# Patient Record
Sex: MALE | Race: OTHER | HISPANIC OR LATINO | URBAN - METROPOLITAN AREA
[De-identification: names, ages, dates, MRNs, and addresses within clinical notes are randomized per-mention and may not be internally consistent; named-entity substitution may affect disease eponyms.]

---

## 2019-09-17 ENCOUNTER — EMERGENCY (EMERGENCY)
Facility: HOSPITAL | Age: 79
LOS: 1 days | Discharge: HOME | End: 2019-09-17
Attending: EMERGENCY MEDICINE
Payer: MEDICAID

## 2019-09-17 VITALS
HEIGHT: 61 IN | OXYGEN SATURATION: 97 % | TEMPERATURE: 98 F | HEART RATE: 76 BPM | WEIGHT: 149.91 LBS | SYSTOLIC BLOOD PRESSURE: 133 MMHG | DIASTOLIC BLOOD PRESSURE: 81 MMHG | RESPIRATION RATE: 20 BRPM

## 2019-09-17 DIAGNOSIS — Z94.0 KIDNEY TRANSPLANT STATUS: Chronic | ICD-10-CM

## 2019-09-17 PROCEDURE — 93971 EXTREMITY STUDY: CPT

## 2019-09-17 PROCEDURE — 73562 X-RAY EXAM OF KNEE 3: CPT

## 2019-09-17 PROCEDURE — 99284 EMERGENCY DEPT VISIT MOD MDM: CPT

## 2019-09-17 PROCEDURE — 93971 EXTREMITY STUDY: CPT | Mod: 26,RT

## 2019-09-17 PROCEDURE — 73562 X-RAY EXAM OF KNEE 3: CPT | Mod: 26,RT

## 2019-09-17 PROCEDURE — 99284 EMERGENCY DEPT VISIT MOD MDM: CPT | Mod: 25

## 2019-09-17 RX ORDER — ACETAMINOPHEN 500 MG
650 TABLET ORAL ONCE
Refills: 0 | Status: COMPLETED | OUTPATIENT
Start: 2019-09-17 | End: 2019-09-17

## 2019-09-17 RX ADMIN — Medication 650 MILLIGRAM(S): at 14:30

## 2019-09-17 RX ADMIN — Medication 650 MILLIGRAM(S): at 13:37

## 2019-09-17 NOTE — ED PROVIDER NOTE - OBJECTIVE STATEMENT
77 y/o male with PMHx of kidney transplant (3 years ago) presents to the ED with c/o sudden onset of acute on chronic right knee pain starting less than 1 hour ago. Pt states that he has had this pain for 5 months but worsened today. Pt denies any trauma or other complaints.

## 2019-09-17 NOTE — ED ADULT NURSE NOTE - OBJECTIVE STATEMENT
States he has on and off right knee pain for a while ,today while walking heard a popping sound back of right knee with pain . States he has on and off right knee pain for a while ,today while walking heard a popping sound back of right knee with pain . Right knee immobilizer applied , cane given for ambulation ,instructions and return demonstration done.

## 2019-09-17 NOTE — ED PROVIDER NOTE - CLINICAL SUMMARY MEDICAL DECISION MAKING FREE TEXT BOX
79 y/o male with acute on chronic knee pain. Will check X-ray of right knee and give Tylenol as pt is a transplant patient. 79 y/o male with acute on chronic knee pain. Will check X-ray of right knee and give Tylenol as pt is a transplant patient.  x-rays doppler are negative  will d/c on tylenol

## 2019-09-17 NOTE — ED PROVIDER NOTE - PATIENT PORTAL LINK FT
You can access the FollowMyHealth Patient Portal offered by [facility] by registering at the following website: [email or web address]. By joining [employer]’s FollowMyHealth portal, you will also be able to view your health information using other applications (apps) compatible with our system.

## 2019-10-20 ENCOUNTER — EMERGENCY (EMERGENCY)
Facility: HOSPITAL | Age: 79
LOS: 1 days | Discharge: HOME | End: 2019-10-20
Attending: EMERGENCY MEDICINE
Payer: MEDICAID

## 2019-10-20 VITALS
RESPIRATION RATE: 18 BRPM | HEIGHT: 61 IN | WEIGHT: 147.93 LBS | OXYGEN SATURATION: 99 % | SYSTOLIC BLOOD PRESSURE: 121 MMHG | HEART RATE: 98 BPM | DIASTOLIC BLOOD PRESSURE: 77 MMHG | TEMPERATURE: 98 F

## 2019-10-20 DIAGNOSIS — Z94.0 KIDNEY TRANSPLANT STATUS: Chronic | ICD-10-CM

## 2019-10-20 LAB
ANION GAP SERPL CALC-SCNC: 7 MMOL/L — SIGNIFICANT CHANGE UP (ref 5–17)
BASOPHILS # BLD AUTO: 0.04 K/UL — SIGNIFICANT CHANGE UP (ref 0–0.2)
BASOPHILS NFR BLD AUTO: 0.3 % — SIGNIFICANT CHANGE UP (ref 0–2)
BUN SERPL-MCNC: 21 MG/DL — HIGH (ref 7–18)
CALCIUM SERPL-MCNC: 8.8 MG/DL — SIGNIFICANT CHANGE UP (ref 8.4–10.5)
CHLORIDE SERPL-SCNC: 103 MMOL/L — SIGNIFICANT CHANGE UP (ref 96–108)
CO2 SERPL-SCNC: 21 MMOL/L — LOW (ref 22–31)
CREAT SERPL-MCNC: 1.04 MG/DL — SIGNIFICANT CHANGE UP (ref 0.5–1.3)
EOSINOPHIL # BLD AUTO: 0.09 K/UL — SIGNIFICANT CHANGE UP (ref 0–0.5)
EOSINOPHIL NFR BLD AUTO: 0.8 % — SIGNIFICANT CHANGE UP (ref 0–6)
GLUCOSE SERPL-MCNC: 142 MG/DL — HIGH (ref 70–99)
HCT VFR BLD CALC: 44.6 % — SIGNIFICANT CHANGE UP (ref 39–50)
HGB BLD-MCNC: 14.3 G/DL — SIGNIFICANT CHANGE UP (ref 13–17)
IMM GRANULOCYTES NFR BLD AUTO: 0.8 % — SIGNIFICANT CHANGE UP (ref 0–1.5)
LYMPHOCYTES # BLD AUTO: 1.42 K/UL — SIGNIFICANT CHANGE UP (ref 1–3.3)
LYMPHOCYTES # BLD AUTO: 11.9 % — LOW (ref 13–44)
MCHC RBC-ENTMCNC: 29.8 PG — SIGNIFICANT CHANGE UP (ref 27–34)
MCHC RBC-ENTMCNC: 32.1 GM/DL — SIGNIFICANT CHANGE UP (ref 32–36)
MCV RBC AUTO: 92.9 FL — SIGNIFICANT CHANGE UP (ref 80–100)
MONOCYTES # BLD AUTO: 1.42 K/UL — HIGH (ref 0–0.9)
MONOCYTES NFR BLD AUTO: 11.9 % — SIGNIFICANT CHANGE UP (ref 2–14)
NEUTROPHILS # BLD AUTO: 8.82 K/UL — HIGH (ref 1.8–7.4)
NEUTROPHILS NFR BLD AUTO: 74.3 % — SIGNIFICANT CHANGE UP (ref 43–77)
NRBC # BLD: 0 /100 WBCS — SIGNIFICANT CHANGE UP (ref 0–0)
PLATELET # BLD AUTO: 136 K/UL — LOW (ref 150–400)
POTASSIUM SERPL-MCNC: 5.2 MMOL/L — SIGNIFICANT CHANGE UP (ref 3.5–5.3)
POTASSIUM SERPL-SCNC: 5.2 MMOL/L — SIGNIFICANT CHANGE UP (ref 3.5–5.3)
RBC # BLD: 4.8 M/UL — SIGNIFICANT CHANGE UP (ref 4.2–5.8)
RBC # FLD: 13.2 % — SIGNIFICANT CHANGE UP (ref 10.3–14.5)
SODIUM SERPL-SCNC: 131 MMOL/L — LOW (ref 135–145)
WBC # BLD: 11.89 K/UL — HIGH (ref 3.8–10.5)
WBC # FLD AUTO: 11.89 K/UL — HIGH (ref 3.8–10.5)

## 2019-10-20 PROCEDURE — 90715 TDAP VACCINE 7 YRS/> IM: CPT

## 2019-10-20 PROCEDURE — 90471 IMMUNIZATION ADMIN: CPT

## 2019-10-20 PROCEDURE — 36415 COLL VENOUS BLD VENIPUNCTURE: CPT

## 2019-10-20 PROCEDURE — 85027 COMPLETE CBC AUTOMATED: CPT

## 2019-10-20 PROCEDURE — 99284 EMERGENCY DEPT VISIT MOD MDM: CPT

## 2019-10-20 PROCEDURE — 99284 EMERGENCY DEPT VISIT MOD MDM: CPT | Mod: 25

## 2019-10-20 PROCEDURE — 73630 X-RAY EXAM OF FOOT: CPT

## 2019-10-20 PROCEDURE — 73630 X-RAY EXAM OF FOOT: CPT | Mod: 26,LT

## 2019-10-20 PROCEDURE — 80048 BASIC METABOLIC PNL TOTAL CA: CPT

## 2019-10-20 RX ORDER — TETANUS TOXOID, REDUCED DIPHTHERIA TOXOID AND ACELLULAR PERTUSSIS VACCINE, ADSORBED 5; 2.5; 8; 8; 2.5 [IU]/.5ML; [IU]/.5ML; UG/.5ML; UG/.5ML; UG/.5ML
0.5 SUSPENSION INTRAMUSCULAR ONCE
Refills: 0 | Status: COMPLETED | OUTPATIENT
Start: 2019-10-20 | End: 2019-10-20

## 2019-10-20 RX ADMIN — Medication 1 TABLET(S): at 16:11

## 2019-10-20 RX ADMIN — TETANUS TOXOID, REDUCED DIPHTHERIA TOXOID AND ACELLULAR PERTUSSIS VACCINE, ADSORBED 0.5 MILLILITER(S): 5; 2.5; 8; 8; 2.5 SUSPENSION INTRAMUSCULAR at 14:22

## 2019-10-20 NOTE — ED PROVIDER NOTE - PROGRESS NOTE DETAILS
Labs wnl, will Pt is well appearing walking with steady gait, stable for discharge and follow up without fail with medical doctor. I had a detailed discussion with the patient and/or guardian regarding the historical points, exam findings, and any diagnostic results supporting the discharge diagnosis. Pt educated on care and need for follow up. Strict return instructions and red flag signs and symptoms discussed with patient. Questions answered. Pt shows understanding of discharge information and agrees to follow.

## 2019-10-20 NOTE — ED PROVIDER NOTE - CLINICAL SUMMARY MEDICAL DECISION MAKING FREE TEXT BOX
78 year old male presents s/p dog bite to left foot. Dog is a family pet and is vaccinated against rabies. patient with mild swelling and tenderness to lateral foot. History of renal transplant. Obtain labs and x-ray. Provide patient with antibiotics and tetanus shot. 78 year old male presents s/p dog bite to left foot. Dog is a family pet and is vaccinated against rabies. patient with mild swelling and tenderness to lateral foot. History of renal transplant. Labs and XR grossly unremarkable. Rx for augmentin. tetanus provided. Vital signs stable. Nontoxic and medically stable for discharge. Return precautions provided and patient understands to return to the ED for worsening signs and symptoms. Instructed to follow up with primary care physician and agreeable. Patient's questions answered.

## 2019-10-20 NOTE — ED PROVIDER NOTE - ATTENDING CONTRIBUTION TO CARE
I performed the initial face to face bedside interview with this patient regarding history of present illness, review of symptoms and past medical, social and family history.  I completed an independent physical examination.  I was the initial provider who evaluated this patient.  The history, review of symptoms and examination was documented by the scribe in my presence and I attest to the accuracy of the documentation.  I have signed out the follow up of any pending tests (i.e. labs, radiological studies) to the NP. I have discussed the patient’s plan of care and disposition with the NP.

## 2019-10-20 NOTE — ED PROVIDER NOTE - OBJECTIVE STATEMENT
78 year old male with history of kidney transplant (3 years ago on medication) presents to the ED after getting bit by family dog two days ago w/ left foot pain and swelling. As per patient's daughter in law, the patient accidentally kicked the animal that was laying under the table sleep, in which the dog retaliated out of fear and bit the patient on the left foot. Dog is vaccinated. Patient taking Tylenol for the pain. Patient denies numbness, tingling, or any other acute complaints. NKDA. 78 year old male with history of kidney transplant (3 years ago on medication) presents to the ED after getting bit by family dog two days ago w/ left foot pain and swelling. As per patient's daughter in law, the patient accidentally kicked the animal that was laying under the table sleep, in which the dog retaliated out of fear. Dog bit the patient on the left foot. Dog is vaccinated. Patient taking Tylenol for the pain. Patient denies numbness, tingling, or any other acute complaints. NKDA.

## 2019-10-20 NOTE — ED PROVIDER NOTE - PATIENT PORTAL LINK FT
You can access the FollowMyHealth Patient Portal offered by [facility] by registering at the following website: [email or web address]. By joining [email or web address]’s FollowMyHealth portal, you will also be able to view your health information using other applications (apps) compatible with our system.

## 2019-10-20 NOTE — ED PROVIDER NOTE - MUSCULOSKELETAL, MLM
left lateral foot swelling with mild erythema. 5/5 muscle strength in LLE. DP and TP pulses 2/4 and palpable.

## 2020-07-09 PROCEDURE — 99281 EMR DPT VST MAYX REQ PHY/QHP: CPT

## 2020-09-04 ENCOUNTER — EMERGENCY (EMERGENCY)
Facility: HOSPITAL | Age: 80
LOS: 1 days | Discharge: HOME | End: 2020-09-04
Attending: EMERGENCY MEDICINE
Payer: MEDICAID

## 2020-09-04 VITALS
HEART RATE: 84 BPM | WEIGHT: 152.12 LBS | OXYGEN SATURATION: 99 % | HEIGHT: 61 IN | SYSTOLIC BLOOD PRESSURE: 129 MMHG | DIASTOLIC BLOOD PRESSURE: 75 MMHG | RESPIRATION RATE: 17 BRPM | TEMPERATURE: 98 F

## 2020-09-04 DIAGNOSIS — Z94.0 KIDNEY TRANSPLANT STATUS: Chronic | ICD-10-CM

## 2020-09-04 LAB
ALBUMIN SERPL ELPH-MCNC: 3.6 G/DL — SIGNIFICANT CHANGE UP (ref 3.5–5)
ALP SERPL-CCNC: 119 U/L — SIGNIFICANT CHANGE UP (ref 40–120)
ALT FLD-CCNC: 16 U/L DA — SIGNIFICANT CHANGE UP (ref 10–60)
ANION GAP SERPL CALC-SCNC: 7 MMOL/L — SIGNIFICANT CHANGE UP (ref 5–17)
AST SERPL-CCNC: 20 U/L — SIGNIFICANT CHANGE UP (ref 10–40)
BASOPHILS # BLD AUTO: 0.04 K/UL — SIGNIFICANT CHANGE UP (ref 0–0.2)
BASOPHILS NFR BLD AUTO: 0.5 % — SIGNIFICANT CHANGE UP (ref 0–2)
BILIRUB SERPL-MCNC: 1.2 MG/DL — SIGNIFICANT CHANGE UP (ref 0.2–1.2)
BUN SERPL-MCNC: 18 MG/DL — SIGNIFICANT CHANGE UP (ref 7–18)
CALCIUM SERPL-MCNC: 9 MG/DL — SIGNIFICANT CHANGE UP (ref 8.4–10.5)
CHLORIDE SERPL-SCNC: 107 MMOL/L — SIGNIFICANT CHANGE UP (ref 96–108)
CO2 SERPL-SCNC: 23 MMOL/L — SIGNIFICANT CHANGE UP (ref 22–31)
CREAT SERPL-MCNC: 1.14 MG/DL — SIGNIFICANT CHANGE UP (ref 0.5–1.3)
EOSINOPHIL # BLD AUTO: 0.17 K/UL — SIGNIFICANT CHANGE UP (ref 0–0.5)
EOSINOPHIL NFR BLD AUTO: 2 % — SIGNIFICANT CHANGE UP (ref 0–6)
GLUCOSE SERPL-MCNC: 102 MG/DL — HIGH (ref 70–99)
HCT VFR BLD CALC: 41.2 % — SIGNIFICANT CHANGE UP (ref 39–50)
HGB BLD-MCNC: 13.1 G/DL — SIGNIFICANT CHANGE UP (ref 13–17)
IMM GRANULOCYTES NFR BLD AUTO: 0.4 % — SIGNIFICANT CHANGE UP (ref 0–1.5)
LYMPHOCYTES # BLD AUTO: 1.23 K/UL — SIGNIFICANT CHANGE UP (ref 1–3.3)
LYMPHOCYTES # BLD AUTO: 14.6 % — SIGNIFICANT CHANGE UP (ref 13–44)
MCHC RBC-ENTMCNC: 29.6 PG — SIGNIFICANT CHANGE UP (ref 27–34)
MCHC RBC-ENTMCNC: 31.8 GM/DL — LOW (ref 32–36)
MCV RBC AUTO: 93 FL — SIGNIFICANT CHANGE UP (ref 80–100)
MONOCYTES # BLD AUTO: 1.04 K/UL — HIGH (ref 0–0.9)
MONOCYTES NFR BLD AUTO: 12.4 % — SIGNIFICANT CHANGE UP (ref 2–14)
NEUTROPHILS # BLD AUTO: 5.91 K/UL — SIGNIFICANT CHANGE UP (ref 1.8–7.4)
NEUTROPHILS NFR BLD AUTO: 70.1 % — SIGNIFICANT CHANGE UP (ref 43–77)
NRBC # BLD: 0 /100 WBCS — SIGNIFICANT CHANGE UP (ref 0–0)
PLATELET # BLD AUTO: 170 K/UL — SIGNIFICANT CHANGE UP (ref 150–400)
POTASSIUM SERPL-MCNC: 4.3 MMOL/L — SIGNIFICANT CHANGE UP (ref 3.5–5.3)
POTASSIUM SERPL-SCNC: 4.3 MMOL/L — SIGNIFICANT CHANGE UP (ref 3.5–5.3)
PROT SERPL-MCNC: 7.2 G/DL — SIGNIFICANT CHANGE UP (ref 6–8.3)
RBC # BLD: 4.43 M/UL — SIGNIFICANT CHANGE UP (ref 4.2–5.8)
RBC # FLD: 13.2 % — SIGNIFICANT CHANGE UP (ref 10.3–14.5)
SODIUM SERPL-SCNC: 137 MMOL/L — SIGNIFICANT CHANGE UP (ref 135–145)
WBC # BLD: 8.42 K/UL — SIGNIFICANT CHANGE UP (ref 3.8–10.5)
WBC # FLD AUTO: 8.42 K/UL — SIGNIFICANT CHANGE UP (ref 3.8–10.5)

## 2020-09-04 PROCEDURE — 99284 EMERGENCY DEPT VISIT MOD MDM: CPT

## 2020-09-04 PROCEDURE — 80180 DRUG SCRN QUAN MYCOPHENOLATE: CPT

## 2020-09-04 PROCEDURE — 90471 IMMUNIZATION ADMIN: CPT

## 2020-09-04 PROCEDURE — 85027 COMPLETE CBC AUTOMATED: CPT

## 2020-09-04 PROCEDURE — 90472 IMMUNIZATION ADMIN EACH ADD: CPT

## 2020-09-04 PROCEDURE — 36415 COLL VENOUS BLD VENIPUNCTURE: CPT

## 2020-09-04 PROCEDURE — 90375 RABIES IG IM/SC: CPT

## 2020-09-04 PROCEDURE — 80197 ASSAY OF TACROLIMUS: CPT

## 2020-09-04 PROCEDURE — 99283 EMERGENCY DEPT VISIT LOW MDM: CPT | Mod: 25

## 2020-09-04 PROCEDURE — 90675 RABIES VACCINE IM: CPT

## 2020-09-04 PROCEDURE — 80053 COMPREHEN METABOLIC PANEL: CPT

## 2020-09-04 RX ORDER — RABIES VACC, HUMAN DIPLOID/PF 2.5 UNIT
1 VIAL (EA) INTRAMUSCULAR ONCE
Refills: 0 | Status: COMPLETED | OUTPATIENT
Start: 2020-09-04 | End: 2020-09-04

## 2020-09-04 RX ORDER — HUMAN RABIES VIRUS IMMUNE GLOBULIN 150 [IU]/ML
1400 INJECTION, SOLUTION INTRAMUSCULAR ONCE
Refills: 0 | Status: COMPLETED | OUTPATIENT
Start: 2020-09-04 | End: 2020-09-04

## 2020-09-04 RX ORDER — HUMAN RABIES VIRUS IMMUNE GLOBULIN 150 [IU]/ML
1400 INJECTION, SOLUTION INTRAMUSCULAR ONCE
Refills: 0 | Status: DISCONTINUED | OUTPATIENT
Start: 2020-09-04 | End: 2020-09-04

## 2020-09-04 RX ADMIN — Medication 1 TABLET(S): at 17:40

## 2020-09-04 RX ADMIN — HUMAN RABIES VIRUS IMMUNE GLOBULIN 1400 UNIT(S): 150 INJECTION, SOLUTION INTRAMUSCULAR at 17:59

## 2020-09-04 RX ADMIN — Medication 1 MILLILITER(S): at 17:59

## 2020-09-04 NOTE — ED PROVIDER NOTE - CLINICAL SUMMARY MEDICAL DECISION MAKING FREE TEXT BOX
Consistent with dog bite which is not currently infected. I have discussed with patient what to watch for, and started patient on antibiotics. Given history, will check basic blood work. Patient advised to return for followup rabies shot.

## 2020-09-04 NOTE — ED PROVIDER NOTE - CARE PLAN
Principal Discharge DX:	Dog bite of hand, right, initial encounter  Secondary Diagnosis:	H/O kidney transplant  Secondary Diagnosis:	Type 2 diabetes mellitus with other specified complication, unspecified whether long term insulin use

## 2020-09-04 NOTE — ED PROVIDER NOTE - PATIENT PORTAL LINK FT
You can access the FollowMyHealth Patient Portal offered by [facility] by registering at the following website: [email or web address]. By joining [email or web address]’s FollowMyHealth portal, you will also be able to view your health information using other applications (apps) compatible with our system.

## 2020-09-04 NOTE — ED PROVIDER NOTE - NSFOLLOWUPINSTRUCTIONS_ED_ALL_ED_FT
IMPORTANT INSTRUCTIONS FROM Dr. COX:    Please follow up with your personal medical doctor in 24-48 hours.   Bring results from today to your visit.    You got the Rabies vaccine today. Additional doses should be administered on days 3, 7, and 14 after the first vaccination. You can come to this ER to get the shots.      Antibiotics as prescribed.  If you were advised to take any medications - be sure to review the package insert.    If your symptoms change, get worse or if you have any new symptoms, come to the ER right away.  If you have any questions, call the ER at the phone number on this page.

## 2020-09-04 NOTE — ED PROVIDER NOTE - MUSCULOSKELETAL, MLM
Laceration to the thenar eminence of the right thumb and to the dorsal side between the metacarpals of the thumb and index fingers. Mild amount of redness noted. Full range of motion. No redness, pain, or streaking down the arm.

## 2020-09-04 NOTE — ED PROVIDER NOTE - OBJECTIVE STATEMENT
79 year old male with PMHx of diabetes mellitus and PSHX of a kidney transplant presents to the ED S/P being bitten by a dog last night. Patient reports that he was bit on his right hand by an unknown dog whose owner he does not know while walking on the street yesterday. Patient reports that he was told by the dog owner that the dog is currently up to date with its vaccinations, although patient is unsure of the reliability of this claim. Patient is currently complaining of mild pain to the wound. Patient states that he cleaned the wound after the incident and applied an antibiotic ointment at home. Patient denies all other acute complaints. NKDA.

## 2020-09-04 NOTE — ED ADULT NURSE NOTE - NSIMPLEMENTINTERV_GEN_ALL_ED
Implemented All Universal Safety Interventions:  [place] to call system. Call bell, personal items and telephone within reach. Instruct patient to call for assistance. Room bathroom lighting operational. Non-slip footwear when patient is off stretcher. Physically safe environment: no spills, clutter or unnecessary equipment. Stretcher in lowest position, wheels locked, appropriate side rails in place.

## 2020-09-04 NOTE — ED PROVIDER NOTE - SECONDARY DIAGNOSIS.
H/O kidney transplant Type 2 diabetes mellitus with other specified complication, unspecified whether long term insulin use

## 2020-09-05 LAB — TACROLIMUS SERPL-MCNC: 9.9 NG/ML — SIGNIFICANT CHANGE UP

## 2020-09-06 LAB — MYCOPHENOLATE SERPL-MCNC: SIGNIFICANT CHANGE UP

## 2020-09-07 ENCOUNTER — EMERGENCY (EMERGENCY)
Facility: HOSPITAL | Age: 80
LOS: 1 days | Discharge: HOME | End: 2020-09-07
Attending: STUDENT IN AN ORGANIZED HEALTH CARE EDUCATION/TRAINING PROGRAM
Payer: MEDICAID

## 2020-09-07 VITALS
SYSTOLIC BLOOD PRESSURE: 137 MMHG | RESPIRATION RATE: 16 BRPM | OXYGEN SATURATION: 100 % | DIASTOLIC BLOOD PRESSURE: 77 MMHG | HEART RATE: 84 BPM | WEIGHT: 149.91 LBS | TEMPERATURE: 98 F | HEIGHT: 61 IN

## 2020-09-07 DIAGNOSIS — Z94.0 KIDNEY TRANSPLANT STATUS: Chronic | ICD-10-CM

## 2020-09-07 PROBLEM — E11.9 TYPE 2 DIABETES MELLITUS WITHOUT COMPLICATIONS: Chronic | Status: ACTIVE | Noted: 2020-09-04

## 2020-09-07 PROCEDURE — 90471 IMMUNIZATION ADMIN: CPT

## 2020-09-07 PROCEDURE — 99281 EMR DPT VST MAYX REQ PHY/QHP: CPT

## 2020-09-07 PROCEDURE — 90675 RABIES VACCINE IM: CPT

## 2020-09-07 RX ORDER — RABIES VACC, HUMAN DIPLOID/PF 2.5 UNIT
1 VIAL (EA) INTRAMUSCULAR ONCE
Refills: 0 | Status: COMPLETED | OUTPATIENT
Start: 2020-09-07 | End: 2020-09-07

## 2020-09-07 RX ADMIN — Medication 1 MILLILITER(S): at 10:42

## 2020-09-07 NOTE — ED PROVIDER NOTE - CLINICAL SUMMARY MEDICAL DECISION MAKING FREE TEXT BOX
Patient presenting for rabies vacc. wound well healed, no signs of infection, on abx. will vaccinate and instructef ro vacc on day 7 and 14. return precaution instructed

## 2020-09-07 NOTE — ED PROVIDER NOTE - OBJECTIVE STATEMENT
79 y.o presenting for 2nd rabies vacc. was bitten by dog 3 days ago in left hand. denies swelling, pain, fever, n, v. 79 y.o presenting for 2nd rabies vacc. was bitten by dog 3 days ago in right hand. denies swelling, pain, fever, n, v.

## 2020-09-07 NOTE — ED PROVIDER NOTE - PHYSICAL EXAMINATION
left hand dog bite +  no erythema or warmth. no discahrge right hand dog bite +  no erythema or warmth. no discahrge

## 2020-09-07 NOTE — ED ADULT NURSE NOTE - FINAL NURSING ELECTRONIC SIGNATURE
Official U/S neg for DVT. Instructed pt to have DVT study repeated in 1 week. Attending Note (Mel): US negative.  will dc. 07-Sep-2020 10:34

## 2020-09-07 NOTE — ED PROVIDER NOTE - PATIENT PORTAL LINK FT
You can access the FollowMyHealth Patient Portal offered by [facility] by registering at the following website: [email or web address]. By joining [employer]’s FollowMyHealth portal, you will also be able to view your health information using other applications (apps) compatible with our system.

## 2020-09-11 ENCOUNTER — EMERGENCY (EMERGENCY)
Facility: HOSPITAL | Age: 80
LOS: 1 days | Discharge: HOME | End: 2020-09-11
Attending: EMERGENCY MEDICINE
Payer: MEDICAID

## 2020-09-11 VITALS
OXYGEN SATURATION: 100 % | HEIGHT: 61 IN | DIASTOLIC BLOOD PRESSURE: 70 MMHG | TEMPERATURE: 99 F | RESPIRATION RATE: 20 BRPM | SYSTOLIC BLOOD PRESSURE: 123 MMHG | WEIGHT: 151.02 LBS | HEART RATE: 77 BPM

## 2020-09-11 DIAGNOSIS — Z94.0 KIDNEY TRANSPLANT STATUS: Chronic | ICD-10-CM

## 2020-09-11 PROCEDURE — 99283 EMERGENCY DEPT VISIT LOW MDM: CPT | Mod: 25

## 2020-09-11 PROCEDURE — 90471 IMMUNIZATION ADMIN: CPT

## 2020-09-11 PROCEDURE — 99281 EMR DPT VST MAYX REQ PHY/QHP: CPT | Mod: 25

## 2020-09-11 PROCEDURE — 90675 RABIES VACCINE IM: CPT

## 2020-09-11 RX ORDER — RABIES VACC, HUMAN DIPLOID/PF 2.5 UNIT
1 VIAL (EA) INTRAMUSCULAR ONCE
Refills: 0 | Status: COMPLETED | OUTPATIENT
Start: 2020-09-11 | End: 2020-09-11

## 2020-09-11 RX ADMIN — Medication 1 MILLILITER(S): at 10:39

## 2020-09-11 NOTE — ED PROVIDER NOTE - OBJECTIVE STATEMENT
79 year old male with PMHx of diabetes mellitus and PSHX of a kidney transplant presents to the ED S/P dog bite on 9/3 for 3rd vaccine shot. Patient states that he was bit by a dog on the right hand. Denies any fever, chills, nausea, vomiting, or any other complaints.

## 2020-09-11 NOTE — ED PROVIDER NOTE - NSFOLLOWUPCLINICS_GEN_ALL_ED_FT
[facility]  [facility] Office  [address] - [address] [place] [address]  Phone: [phone]  Fax: [phone]  Follow Up Time:

## 2020-09-11 NOTE — ED PROVIDER NOTE - CHIEF COMPLAINT
The patient is a 79y Male complaining of bite, animal. [FreeTextEntry1] : # HTN controlled.\par * The patient's blood pressure was checked with the [employer] HEM-907XL using the SPRINT trial protocol after sitting quietly in an empty room with arm supported, back supported, and feet on the floor for 5 minutes. The average of 3 readings were taken. \par * A counseling information sheet had been given and they were provided sufficient time to review this sheet. All their questions were answered.\par * The patient has been counseled to check their BP at home with an automatic arm cuff, write down the readings, and reach me directly on the phone immediately if they are persistently > 180 systolic or if SBP is less than 100 or if lightheadedness develops. They were counseled to bring in all blood pressure readings and medications next visit.\par * The patient has been counseled that they have a a significant medical condition and regular office followup (at least every 4 months for now) is essential for monitoring, and that it is their responsibility to make follow up appointments.\par * The patient also has been counseled that they must never stop or change any medications without discussing this with me (or another physician). \par \par # CKD stage 3.\par * Recheck labs.\par * The patient has been counseled that chronic kidney disease is a significant condition and regular office followup with me (at least every 4 months for now) is essential for monitoring, and that it is their responsibility to make a follow up appointment.\par * A point of care renal ultrasound using the Butterfly iQ was performed and the images were personally reviewed. (The patient understands that this was a brief study to evaluate for hydronephrosis and not a complete renal ultrasound.) The ultrasound showed no significant hydronephrosis, b vol 226. \par * A counseling information sheet had been given and they were provided sufficient time to review this sheet. All their questions were answered.\par * The patient has been counseled never to stop taking their medications without discussing it with me or another doctor.\par * The patient has been counseled on risk of acute renal failure and instructed to immediately call and speak with me or go immediately to ER with any severe symptoms, nausea, vomiting, diarrhea, chest pain, or shortness of breath.\par * The patient has been counseled on avoiding NSAIDs.\par * Reducing or avoiding red meat, following a relatively low oxalate diet, and starting folate 800 mg qd has been advised.\par \par # Elevated PSA.\par * Advised to follow with urology.\par \par # DM.\par * Check HGA1c.\par * Follow up with Dr. Eller.

## 2020-09-11 NOTE — ED PROVIDER NOTE - CLINICAL SUMMARY MEDICAL DECISION MAKING FREE TEXT BOX
79 year old male presenting for his 3rd vaccine after being bit by dog on the right hand about a week ago. Wound is healing well. No signs of infection. Will give rabies shot and discharge home.

## 2020-09-11 NOTE — ED PROVIDER NOTE - PATIENT PORTAL LINK FT
You can access the FollowMyHealth Patient Portal offered by [facility] by registering at the following website: [email or web address]. By joining [email or web address]’s FollowMyHealth portal, you will also be able to view your health information using other applications (apps) compatible with our system.

## 2020-09-11 NOTE — ED PROVIDER NOTE - NSFOLLOWUPINSTRUCTIONS_ED_ALL_ED_FT
You were seen today for your third rabies vaccines. Please return on Sept 18th, 2020 for your fourth rabies vaccine. Please return to the Emergency Department for worsening signs or symptoms.    Te vieron hoy para tus terceras vacunas contra la lucy. Por favor, regrese el 18 de septiembre de 2020 para alexander cuarta vacuna contra la lucy. Por favor, regrese al Departamento de Emergencias para que empeore los signos o síntomas.

## 2020-09-11 NOTE — ED PROVIDER NOTE - PHYSICAL EXAMINATION
right hand:   radial/ulnar pulses 2/4 palpable   median, radial, ulnar nerves intact   superficial skin tear to dorsal thenar aspect of right hand healing well, punctate wounds to the thenar eminence healing with scabs, without erythema or drainage

## 2020-09-11 NOTE — ED ADULT NURSE NOTE - OBJECTIVE STATEMENT
Pt AOx4, ambulatory, returning for 3rd rabbies shot. p/w right hand animal bite. No sign of infection noted. Pt must return on 09/18/20 for 4th shot.

## 2020-09-18 ENCOUNTER — EMERGENCY (EMERGENCY)
Facility: HOSPITAL | Age: 80
LOS: 1 days | Discharge: HOME | End: 2020-09-18
Attending: STUDENT IN AN ORGANIZED HEALTH CARE EDUCATION/TRAINING PROGRAM
Payer: MEDICAID

## 2020-09-18 VITALS
WEIGHT: 151.02 LBS | SYSTOLIC BLOOD PRESSURE: 150 MMHG | HEIGHT: 61 IN | HEART RATE: 79 BPM | OXYGEN SATURATION: 98 % | DIASTOLIC BLOOD PRESSURE: 84 MMHG | RESPIRATION RATE: 18 BRPM | TEMPERATURE: 98 F

## 2020-09-18 DIAGNOSIS — Z94.0 KIDNEY TRANSPLANT STATUS: Chronic | ICD-10-CM

## 2020-09-18 PROCEDURE — 99281 EMR DPT VST MAYX REQ PHY/QHP: CPT | Mod: 25

## 2020-09-18 PROCEDURE — 90471 IMMUNIZATION ADMIN: CPT

## 2020-09-18 PROCEDURE — 99281 EMR DPT VST MAYX REQ PHY/QHP: CPT

## 2020-09-18 PROCEDURE — 90675 RABIES VACCINE IM: CPT

## 2020-09-18 RX ORDER — RABIES VACC, HUMAN DIPLOID/PF 2.5 UNIT
1 VIAL (EA) INTRAMUSCULAR ONCE
Refills: 0 | Status: COMPLETED | OUTPATIENT
Start: 2020-09-18 | End: 2020-09-18

## 2020-09-18 RX ADMIN — Medication 1 MILLILITER(S): at 10:47

## 2020-09-18 NOTE — ED PROVIDER NOTE - OBJECTIVE STATEMENT
79M presenting for 4th rabies vaccination. no reaction to previous injections, no fever, chest pain, nausea, vomiting or shortness of breath.

## 2020-09-18 NOTE — ED ADULT NURSE NOTE - CAS TRG GENERAL NORM CIRC DET
[FreeTextEntry1] : PFT-spi reveals normal flows with FEV1 of  2.49    , which is   102% of predicted, normal flow volume loop \par \par Chest CT 03.08.2016 reveals 3 mm right upper lobe groundglass nodule not clearly evident on prior examination. 6 month follow up chest CT examination can be obtained. 3.4 cm ascending aorta. Mild tracheal narrowing related to enlarged thyroid gland.\par \par 6 minute walk test reveals a low saturation of 96% with no evidence of dyspnea or fatigue; walked 505.3 meters \par \par CXR reveals a normal sized heart; no evidence of infiltrate or effusion--a normal appearing chest radiograph 
Strong peripheral pulses

## 2020-09-18 NOTE — ED PROVIDER NOTE - PHYSICAL EXAMINATION
General: well appearing male, no acute distress   HEENT: normocephalic, atraumatic   Respiratory: normal work of breathing  MSK: no swelling or tenderness of lower extremities, moving all extremities spontaneously   Skin: warm, dry   Neuro: A&Ox3, normal gait   Psych: appropriate affect

## 2020-09-18 NOTE — ED PROVIDER NOTE - PATIENT PORTAL LINK FT
You can access the FollowMyHealth Patient Portal offered by [facility] by registering at the following website: [email or web address]. By joining [employer]’s FollowMyHealth portal, you will also be able to view your health information using other applications (apps) compatible with our system.

## 2020-09-18 NOTE — ED PROVIDER NOTE - NSFOLLOWUPINSTRUCTIONS_ED_ALL_ED_FT
You were seen in the emergency department for your 4th rabies vaccination.     Please follow-up with your primary care doctor in the next 24-48 hours.     If you have any worsening symptoms, severe chest pain, shortness of breath, nausea or vomiting, please return to the emergency department.

## 2020-09-18 NOTE — ED ADULT NURSE NOTE - NSIMPLEMENTINTERV_GEN_ALL_ED
Implemented All Universal Safety Interventions:  [place] to call system. Call bell, personal items and telephone within reach. Instruct patient to call for assistance. Room bathroom lighting operational. Non-slip footwear when patient is off stretcher. Physically safe environment: no spills, clutter or unnecessary equipment. Stretcher in lowest position, wheels locked, appropriate side rails in place.

## 2020-09-18 NOTE — ED ADULT NURSE NOTE - CHPI ED NUR SYMPTOMS NEG
no dizziness/no pain/no chills/no tingling/no weakness/no decreased eating/drinking/no fever/no vomiting/no nausea

## 2020-09-18 NOTE — ED PROVIDER NOTE - CLINICAL SUMMARY MEDICAL DECISION MAKING FREE TEXT BOX
79M presenting for 4th rabies vaccination. well appearing without reaction to previous vaccinations.

## 2020-12-31 NOTE — ED ADULT NURSE NOTE - FINAL NURSING ELECTRONIC SIGNATURE
18-Sep-2020 11:19 No significant past surgical history    No significant past surgical history    No significant past surgical history

## 2021-10-05 ENCOUNTER — INPATIENT (INPATIENT)
Facility: HOSPITAL | Age: 81
LOS: 2 days | Discharge: HOME | DRG: 445 | End: 2021-10-08
Attending: INTERNAL MEDICINE | Admitting: INTERNAL MEDICINE
Payer: MEDICAID

## 2021-10-05 VITALS
WEIGHT: 154.32 LBS | RESPIRATION RATE: 18 BRPM | HEIGHT: 61 IN | HEART RATE: 76 BPM | SYSTOLIC BLOOD PRESSURE: 136 MMHG | OXYGEN SATURATION: 98 % | TEMPERATURE: 99 F | DIASTOLIC BLOOD PRESSURE: 76 MMHG

## 2021-10-05 DIAGNOSIS — Z29.9 ENCOUNTER FOR PROPHYLACTIC MEASURES, UNSPECIFIED: ICD-10-CM

## 2021-10-05 DIAGNOSIS — I10 ESSENTIAL (PRIMARY) HYPERTENSION: ICD-10-CM

## 2021-10-05 DIAGNOSIS — E11.9 TYPE 2 DIABETES MELLITUS WITHOUT COMPLICATIONS: ICD-10-CM

## 2021-10-05 DIAGNOSIS — Z94.0 KIDNEY TRANSPLANT STATUS: Chronic | ICD-10-CM

## 2021-10-05 DIAGNOSIS — Z94.0 KIDNEY TRANSPLANT STATUS: ICD-10-CM

## 2021-10-05 DIAGNOSIS — R10.9 UNSPECIFIED ABDOMINAL PAIN: ICD-10-CM

## 2021-10-05 DIAGNOSIS — R74.01 ELEVATION OF LEVELS OF LIVER TRANSAMINASE LEVELS: ICD-10-CM

## 2021-10-05 DIAGNOSIS — K80.20 CALCULUS OF GALLBLADDER WITHOUT CHOLECYSTITIS WITHOUT OBSTRUCTION: ICD-10-CM

## 2021-10-05 LAB
ALBUMIN SERPL ELPH-MCNC: 4.2 G/DL — SIGNIFICANT CHANGE UP (ref 3.5–5)
ALP SERPL-CCNC: 150 U/L — HIGH (ref 40–120)
ALT FLD-CCNC: 133 U/L DA — HIGH (ref 10–60)
ANION GAP SERPL CALC-SCNC: 8 MMOL/L — SIGNIFICANT CHANGE UP (ref 5–17)
APTT BLD: 26.8 SEC — LOW (ref 27.5–35.5)
AST SERPL-CCNC: 169 U/L — HIGH (ref 10–40)
BILIRUB SERPL-MCNC: 3.6 MG/DL — HIGH (ref 0.2–1.2)
BUN SERPL-MCNC: 22 MG/DL — HIGH (ref 7–18)
CALCIUM SERPL-MCNC: 9.8 MG/DL — SIGNIFICANT CHANGE UP (ref 8.4–10.5)
CHLORIDE SERPL-SCNC: 102 MMOL/L — SIGNIFICANT CHANGE UP (ref 96–108)
CO2 SERPL-SCNC: 28 MMOL/L — SIGNIFICANT CHANGE UP (ref 22–31)
CREAT SERPL-MCNC: 1.14 MG/DL — SIGNIFICANT CHANGE UP (ref 0.5–1.3)
GLUCOSE BLDC GLUCOMTR-MCNC: 139 MG/DL — HIGH (ref 70–99)
GLUCOSE BLDC GLUCOMTR-MCNC: 143 MG/DL — HIGH (ref 70–99)
GLUCOSE SERPL-MCNC: 199 MG/DL — HIGH (ref 70–99)
HCT VFR BLD CALC: 41.1 % — SIGNIFICANT CHANGE UP (ref 39–50)
HGB BLD-MCNC: 13 G/DL — SIGNIFICANT CHANGE UP (ref 13–17)
INR BLD: 1.05 RATIO — SIGNIFICANT CHANGE UP (ref 0.88–1.16)
LACTATE SERPL-SCNC: 2 MMOL/L — SIGNIFICANT CHANGE UP (ref 0.7–2)
LIDOCAIN IGE QN: 202 U/L — SIGNIFICANT CHANGE UP (ref 73–393)
MCHC RBC-ENTMCNC: 29.3 PG — SIGNIFICANT CHANGE UP (ref 27–34)
MCHC RBC-ENTMCNC: 31.6 GM/DL — LOW (ref 32–36)
MCV RBC AUTO: 92.8 FL — SIGNIFICANT CHANGE UP (ref 80–100)
NRBC # BLD: 0 /100 WBCS — SIGNIFICANT CHANGE UP (ref 0–0)
PLATELET # BLD AUTO: 187 K/UL — SIGNIFICANT CHANGE UP (ref 150–400)
POTASSIUM SERPL-MCNC: 4.6 MMOL/L — SIGNIFICANT CHANGE UP (ref 3.5–5.3)
POTASSIUM SERPL-SCNC: 4.6 MMOL/L — SIGNIFICANT CHANGE UP (ref 3.5–5.3)
PROT SERPL-MCNC: 7.5 G/DL — SIGNIFICANT CHANGE UP (ref 6–8.3)
PROTHROM AB SERPL-ACNC: 12.5 SEC — SIGNIFICANT CHANGE UP (ref 10.6–13.6)
RBC # BLD: 4.43 M/UL — SIGNIFICANT CHANGE UP (ref 4.2–5.8)
RBC # FLD: 13.6 % — SIGNIFICANT CHANGE UP (ref 10.3–14.5)
SARS-COV-2 RNA SPEC QL NAA+PROBE: SIGNIFICANT CHANGE UP
SODIUM SERPL-SCNC: 138 MMOL/L — SIGNIFICANT CHANGE UP (ref 135–145)
WBC # BLD: 11.05 K/UL — HIGH (ref 3.8–10.5)
WBC # FLD AUTO: 11.05 K/UL — HIGH (ref 3.8–10.5)

## 2021-10-05 PROCEDURE — 99285 EMERGENCY DEPT VISIT HI MDM: CPT

## 2021-10-05 PROCEDURE — 76700 US EXAM ABDOM COMPLETE: CPT | Mod: 26

## 2021-10-05 PROCEDURE — 74019 RADEX ABDOMEN 2 VIEWS: CPT | Mod: 26

## 2021-10-05 PROCEDURE — 76857 US EXAM PELVIC LIMITED: CPT | Mod: 26

## 2021-10-05 PROCEDURE — 74177 CT ABD & PELVIS W/CONTRAST: CPT | Mod: 26

## 2021-10-05 PROCEDURE — 93010 ELECTROCARDIOGRAM REPORT: CPT

## 2021-10-05 RX ORDER — HEPARIN SODIUM 5000 [USP'U]/ML
5000 INJECTION INTRAVENOUS; SUBCUTANEOUS EVERY 8 HOURS
Refills: 0 | Status: DISCONTINUED | OUTPATIENT
Start: 2021-10-05 | End: 2021-10-08

## 2021-10-05 RX ORDER — INFLUENZA VIRUS VACCINE 15; 15; 15; 15 UG/.5ML; UG/.5ML; UG/.5ML; UG/.5ML
0.5 SUSPENSION INTRAMUSCULAR ONCE
Refills: 0 | Status: DISCONTINUED | OUTPATIENT
Start: 2021-10-05 | End: 2021-10-08

## 2021-10-05 RX ORDER — TACROLIMUS 5 MG/1
0.5 CAPSULE ORAL DAILY
Refills: 0 | Status: DISCONTINUED | OUTPATIENT
Start: 2021-10-05 | End: 2021-10-05

## 2021-10-05 RX ORDER — MORPHINE SULFATE 50 MG/1
4 CAPSULE, EXTENDED RELEASE ORAL ONCE
Refills: 0 | Status: DISCONTINUED | OUTPATIENT
Start: 2021-10-05 | End: 2021-10-05

## 2021-10-05 RX ORDER — INSULIN LISPRO 100/ML
VIAL (ML) SUBCUTANEOUS
Refills: 0 | Status: DISCONTINUED | OUTPATIENT
Start: 2021-10-05 | End: 2021-10-08

## 2021-10-05 RX ORDER — ASPIRIN/CALCIUM CARB/MAGNESIUM 324 MG
81 TABLET ORAL DAILY
Refills: 0 | Status: DISCONTINUED | OUTPATIENT
Start: 2021-10-05 | End: 2021-10-08

## 2021-10-05 RX ORDER — TACROLIMUS 5 MG/1
1 CAPSULE ORAL
Qty: 0 | Refills: 0 | DISCHARGE

## 2021-10-05 RX ORDER — TAMSULOSIN HYDROCHLORIDE 0.4 MG/1
0.4 CAPSULE ORAL AT BEDTIME
Refills: 0 | Status: DISCONTINUED | OUTPATIENT
Start: 2021-10-05 | End: 2021-10-08

## 2021-10-05 RX ORDER — MYCOPHENOLATE MOFETIL 250 MG/1
0 CAPSULE ORAL
Qty: 0 | Refills: 0 | DISCHARGE

## 2021-10-05 RX ORDER — ONDANSETRON 8 MG/1
4 TABLET, FILM COATED ORAL EVERY 8 HOURS
Refills: 0 | Status: DISCONTINUED | OUTPATIENT
Start: 2021-10-05 | End: 2021-10-08

## 2021-10-05 RX ORDER — TACROLIMUS 5 MG/1
1 CAPSULE ORAL EVERY 12 HOURS
Refills: 0 | Status: DISCONTINUED | OUTPATIENT
Start: 2021-10-05 | End: 2021-10-05

## 2021-10-05 RX ORDER — MYCOPHENOLATE MOFETIL 250 MG/1
750 CAPSULE ORAL
Refills: 0 | Status: DISCONTINUED | OUTPATIENT
Start: 2021-10-05 | End: 2021-10-08

## 2021-10-05 RX ORDER — TACROLIMUS 5 MG/1
1.5 CAPSULE ORAL
Refills: 0 | Status: DISCONTINUED | OUTPATIENT
Start: 2021-10-05 | End: 2021-10-06

## 2021-10-05 RX ORDER — MYCOPHENOLATE MOFETIL 250 MG/1
250 CAPSULE ORAL
Refills: 0 | Status: DISCONTINUED | OUTPATIENT
Start: 2021-10-05 | End: 2021-10-05

## 2021-10-05 RX ORDER — INSULIN LISPRO 100/ML
VIAL (ML) SUBCUTANEOUS AT BEDTIME
Refills: 0 | Status: DISCONTINUED | OUTPATIENT
Start: 2021-10-05 | End: 2021-10-08

## 2021-10-05 RX ORDER — MYCOPHENOLATE MOFETIL 250 MG/1
1 CAPSULE ORAL
Qty: 0 | Refills: 0 | DISCHARGE

## 2021-10-05 RX ORDER — TACROLIMUS 5 MG/1
1 CAPSULE ORAL
Refills: 0 | Status: DISCONTINUED | OUTPATIENT
Start: 2021-10-05 | End: 2021-10-08

## 2021-10-05 RX ORDER — ACETAMINOPHEN 500 MG
650 TABLET ORAL EVERY 6 HOURS
Refills: 0 | Status: DISCONTINUED | OUTPATIENT
Start: 2021-10-05 | End: 2021-10-08

## 2021-10-05 RX ORDER — SODIUM CHLORIDE 9 MG/ML
1000 INJECTION INTRAMUSCULAR; INTRAVENOUS; SUBCUTANEOUS
Refills: 0 | Status: DISCONTINUED | OUTPATIENT
Start: 2021-10-05 | End: 2021-10-07

## 2021-10-05 RX ADMIN — MORPHINE SULFATE 4 MILLIGRAM(S): 50 CAPSULE, EXTENDED RELEASE ORAL at 14:01

## 2021-10-05 RX ADMIN — SODIUM CHLORIDE 60 MILLILITER(S): 9 INJECTION INTRAMUSCULAR; INTRAVENOUS; SUBCUTANEOUS at 18:55

## 2021-10-05 RX ADMIN — MORPHINE SULFATE 4 MILLIGRAM(S): 50 CAPSULE, EXTENDED RELEASE ORAL at 13:31

## 2021-10-05 RX ADMIN — MYCOPHENOLATE MOFETIL 750 MILLIGRAM(S): 250 CAPSULE ORAL at 18:56

## 2021-10-05 RX ADMIN — TACROLIMUS 1 MILLIGRAM(S): 5 CAPSULE ORAL at 20:16

## 2021-10-05 RX ADMIN — TAMSULOSIN HYDROCHLORIDE 0.4 MILLIGRAM(S): 0.4 CAPSULE ORAL at 21:37

## 2021-10-05 RX ADMIN — HEPARIN SODIUM 5000 UNIT(S): 5000 INJECTION INTRAVENOUS; SUBCUTANEOUS at 21:37

## 2021-10-05 RX ADMIN — SODIUM CHLORIDE 60 MILLILITER(S): 9 INJECTION INTRAMUSCULAR; INTRAVENOUS; SUBCUTANEOUS at 21:37

## 2021-10-05 NOTE — H&P ADULT - PROBLEM SELECTOR PLAN 3
- Patient has hx of renal transplantation on Tacrolimus and Mycophenolate  - C/w home meds   - Monitor BMP daily - Patient has hx of renal transplantation on Tacrolimus and Mycophenolate  - C/w home meds   - Monitor BMP daily  - Nephro Dr Mccartney consulted - Patient has hx of renal transplantation on Tacrolimus and Mycophenolate  - C/w home meds   - F/u Tacrolimus level   - Monitor BMP daily  - Nephro Dr Mccartney consulted

## 2021-10-05 NOTE — ED ADULT NURSE NOTE - PAIN: PRESENCE, MLM
Refill passed per [facility] protocol.   Diabetes Medications  Protocol Criteria:  · Appointment scheduled in the past 6 months or the next 3 months  · A1C < 7.5 in the past 6 months  · Creatinine in the past 12 months  · Creatinine result < 1.5   Rece
complains of pain/discomfort

## 2021-10-05 NOTE — ED PROVIDER NOTE - ATTENDING CONTRIBUTION TO CARE
81 yo M with renal transplant c/o sharp epigastric abdo pain overnight.     VS wnl  Moderate painful distress  RRR  Lungs clear  Epigastric abdo ttp with mild guarding, no rebound  AAOx3    AP - abdo pain  labs  imaging  meds  reassess    300PM   labs - WBC 11, Cr wnl, bili 3.6, transaminitis, covid negative  US - cholelithiasis; CBD wnl; hydro of transplant kidney  Urology discussed case regarding hydro; Dr Perry states if solitary kidney and normal Cr then symptoms like chronic and not acute.   CT abdo pending. PCP not affiliated, endorsed to unattached Dr Barnett

## 2021-10-05 NOTE — H&P ADULT - NSHPREVIEWOFSYSTEMS_GEN_ALL_CORE
REVIEW OF SYSTEMS:  CONSTITUTIONAL: No fever, weight loss, or fatigue  RESPIRATORY: No cough, wheezing, chills or hemoptysis; No shortness of breath  CARDIOVASCULAR: No chest pain, palpitations, dizziness, or leg swelling  GASTROINTESTINAL: No abdominal pain. No nausea, vomiting, or hematemesis; No diarrhea or constipation. No melena or hematochezia.  GENITOURINARY: No dysuria or hematuria, urinary frequency  NEUROLOGICAL: No headaches, memory loss, loss of strength, numbness, or tremors  SKIN: No itching, burning, rashes, or lesions REVIEW OF SYSTEMS:  CONSTITUTIONAL: No fever, weight loss, or fatigue  RESPIRATORY: No cough, wheezing, chills or hemoptysis; No shortness of breath  CARDIOVASCULAR: No chest pain, palpitations, dizziness, or leg swelling  GASTROINTESTINAL: abdominal pain+ nausea+, no vomiting, or hematemesis; No diarrhea or constipation. No melena or hematochezia.  GENITOURINARY: No dysuria or hematuria, urinary frequency  NEUROLOGICAL: No headaches, memory loss, loss of strength, numbness, or tremors  SKIN: No itching, burning, rashes, or lesions

## 2021-10-05 NOTE — ED ADULT TRIAGE NOTE - INTERNATIONAL TRAVEL
No
Mansi Robbins), HematologyOncology; Internal Medicine  [address] [place] [address]  Phone: [phone]  Fax: [phone]

## 2021-10-05 NOTE — H&P ADULT - PROBLEM SELECTOR PLAN 6
IMPROVE VTE Individual Risk Assessment  RISK                                                                Points  [  ] Previous VTE                                                  3  [  ] Thrombophilia                                               2  [  ] Lower limb paralysis                                      2        (unable to hold up >15 seconds)    [  ] Current Cancer                                              2         (within 6 months)  [  ] Immobilization > 24 hrs                                1  [  ] ICU/CCU stay > 24 hours                              1  [  ] Age > 60                                                      1  IMPROVE VTE Score _____2____    PPI for GI prophylaxis  Heparin for DVT PPX

## 2021-10-05 NOTE — ED PROVIDER NOTE - LANGUAGE ASSISTANCE NEEDED
Spoke with Dr Jimenez's office at 850 on 3/2/20.  Will submit a request Dr Jimenez to update from.   Yes-Patient/Caregiver accepts free interpretation services...

## 2021-10-05 NOTE — H&P ADULT - PROBLEM SELECTOR PLAN 5
- Patient has hx of DM on Metformin at home  - Holding home po medications  - Started on HSS  - Fingerstick before meals and at bedtime  - DASH diet with consistent carb  - [employer] -180  - F/u A1c

## 2021-10-05 NOTE — H&P ADULT - ASSESSMENT
80 year old male has past medical hx of CKD5 s/p kidney transplant on 2016, hypertension, diabetes came to ED c/o epigastric pain admitted for cholelithiasis

## 2021-10-05 NOTE — ED PROVIDER NOTE - CHPI ED SYMPTOMS NEG
no abdominal distension/no blood in stool/no diarrhea/no dysuria/no fever/no hematuria/no vomiting/no burning urination

## 2021-10-05 NOTE — PROGRESS NOTE ADULT - SUBJECTIVE AND OBJECTIVE BOX
[facility] : Progress Note :: [phone], (office [phone]),   Dr Mccartney / Dr Carson / Dr Trujillo / Dr Hamilton / Dr Deny BUSBY / Dr Sinclair / Dr Robison / Dr Danial trimble  _____________________________________________________________________________________________  Patient is a 80y Male whom presented to the hospital with epigastric pain.  He has H/O HTN, DM2 and ESRD was on HD. s/p DDRTXP at [facility] in 2016. Follows with Dr Contreras, who is spoke with.  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  SCR at baseline. denies hematuria     PAST MEDICAL & SURGICAL HISTORY:  DM (diabetes mellitus)    Hypertension    H/O kidney transplant      No Known Allergies    Home Medications Reviewed  Hospital Medications:   MEDICATIONS  (STANDING):  aspirin enteric coated 81 milliGRAM(s) Oral daily  heparin   Injectable 5000 Unit(s) SubCutaneous every 8 hours  insulin lispro (ADMELOG) corrective regimen sliding scale   SubCutaneous three times a day before meals  insulin lispro (ADMELOG) corrective regimen sliding scale   SubCutaneous at bedtime  mycophenolate mofetil 750 milliGRAM(s) Oral two times a day  sodium chloride 0.9%. 1000 milliLiter(s) (60 mL/Hr) IV Continuous <Continuous>  tacrolimus 1.5 milliGRAM(s) Oral <User Schedule>  tacrolimus 1 milliGRAM(s) Oral <User Schedule>  tamsulosin 0.4 milliGRAM(s) Oral at bedtime    SOCIAL HISTORY:  Denies ETOh,Smoking,   FAMILY HISTORY:    +    VITALS:  T(F): 98.3 (10-05-21 @ 15:38), Max: 98.7 (10-05-21 @ 08:57)  HR: 108 (10-05-21 @ 15:38)  BP: 114/68 (10-05-21 @ 15:38)  RR: 18 (10-05-21 @ 15:38)  SpO2: 96% (10-05-21 @ 15:38)  Wt(kg): --    Height (cm): 154.9 (10-05 @ 08:57)  Weight (kg): 70 (10-05 @ 08:57)  BMI (kg/m2): 29.2 (10-05 @ 08:57)  BSA (m2): 1.69 (10-05 @ 08:57)  PHYSICAL EXAM:  Constitutional: NAD  HEENT: anicteric sclera, oropharynx clear.  Neck: No JVD  Respiratory: CTAB, no wheezes, rales or rhonchi  Cardiovascular: S1, S2, RRR  Gastrointestinal: BS+, soft, NT/ND. RLQ allograft non-tender. No thrill  Extremities:  No peripheral edema  Neurological: A/O x 3, no focal deficits  Vascular Access: LUEAVF with thrill and bruit    LABS:  10-05    138  |  102  |  22<H>  ----------------------------<  199<H>  4.6   |  28  |  1.14    Ca    9.8      05 Oct 2021 09:41    TPro  7.5  /  Alb  4.2  /  TBili  3.6<H>  /  DBili      /  AST  169<H>  /  ALT  133<H>  /  AlkPhos  150<H>  10-05    Creatinine Trend: 1.14 <--                        13.0   11.05 )-----------( 187      ( 05 Oct 2021 09:41 )             41.1     Urine Studies:      RADIOLOGY & ADDITIONAL STUDIES:

## 2021-10-05 NOTE — H&P ADULT - PROBLEM SELECTOR PLAN 4
- Patient has history of Hypertension not on any meds at home   - DASH diet  - Monitor BP and start meds as needed

## 2021-10-05 NOTE — H&P ADULT - ATTENDING COMMENTS
seen and examined in er earlier d/w er, resident  upper abd pain  elevated lft    denies lt sided cp or palp or sob  plans d/w resident   GI

## 2021-10-05 NOTE — ED PROVIDER NOTE - CLINICAL SUMMARY MEDICAL DECISION MAKING FREE TEXT BOX
80M with hx of kidney transplant complains of epigastric pain after eating crab yesterday. Pain control, CBC, CMP, lactate, lipase, UA abdominal xray. 80M with hx of kidney transplant complains of epigastric pain after eating crab yesterday. Pain control, CBC, CMP, lactate, lipase, UA, AXR  Abdominal xray unremarkable. Lipase, lactate wnl  Pt with elevated LFTs. Abdominal US ordered. 80M with hx of kidney transplant complains of epigastric pain after eating crab yesterday. Pain control, CBC, CMP, lactate, lipase, UA, AXR  Abdominal xray unremarkable. Lipase, lactate wnl. Pt with elevated LFTs. Gallstones and hydronephrosis visualized on abdominal US. Unknown if hydronephrosis is new or chronic after calling [facility] and PCP Singh Sullivan.  CT a/p ordered

## 2021-10-05 NOTE — PROGRESS NOTE ADULT - SUBJECTIVE AND OBJECTIVE BOX
HPI:  · Chief Complaint: The patient is a 80y Male complaining of epigastric pain  · HPI Objective Statement: 81 y/o Male from home with PMH of DM and kidney transplant complains of epigastric pain since yesterday. Pain began after eating crab last night, 10/10 at its worst, nonradiating, and associated with nausea without vomiting or diarrhea. Patient denies headache, dizziness, chest pain, SOB, or  complaints. Patient denies smoking or alcohol use        Patient is a 80y old  Male who presents with a chief complaint of     INTERVAL HPI/OVERNIGHT EVENTS:  T(C): 37.1 (10-05-21 @ 08:57), Max: 37.1 (10-05-21 @ 08:57)  HR: 76 (10-05-21 @ 08:57) (76 - 76)  BP: 136/76 (10-05-21 @ 08:57) (136/76 - 136/76)  RR: 18 (10-05-21 @ 08:57) (18 - 18)  SpO2: 98% (10-05-21 @ 08:57) (98% - 98%)  Wt(kg): --  I&O's Summary      REVIEW OF SYSTEMS: denies fever, chills, SOB, palpitations, chest pain, abdominal pain, nausea, vomitting, diarrhea, constipation, dizziness    MEDICATIONS  (STANDING):    MEDICATIONS  (PRN):      PHYSICAL EXAM:  GENERAL: NAD, well-groomed, well-developed  HEAD:  Atraumatic, Normocephalic  EYES: EOMI, PERRLA, conjunctiva and sclera clear  ENMT: No tonsillar erythema, exudates, or enlargement; Moist mucous membranes, Good dentition, No lesions  NECK: Supple, No JVD, Normal thyroid  NERVOUS SYSTEM:  Alert & Oriented X3, Good concentration; Motor Strength 5/5 B/L upper and lower extremities; DTRs 2+ intact and symmetric  CHEST/LUNG: Clear to percussion bilaterally; No rales, rhonchi, wheezing, or rubs  HEART: Regular rate and rhythm; No murmurs, rubs, or gallops  ABDOMEN: Soft, Nontender, Nondistended; Bowel sounds present  EXTREMITIES:  2+ Peripheral Pulses, No clubbing, cyanosis, or edema  LYMPH: No lymphadenopathy noted  SKIN: No rashes or lesions  LABS:                        13.0   11.05 )-----------( 187      ( 05 Oct 2021 09:41 )             41.1     10-05    138  |  102  |  22<H>  ----------------------------<  199<H>  4.6   |  28  |  1.14    Ca    9.8      05 Oct 2021 09:41    TPro  7.5  /  Alb  4.2  /  TBili  3.6<H>  /  DBili  x   /  AST  169<H>  /  ALT  133<H>  /  AlkPhos  150<H>  10-05    PT/INR - ( 05 Oct 2021 09:41 )   PT: 12.5 sec;   INR: 1.05 ratio         PTT - ( 05 Oct 2021 09:41 )  PTT:26.8 sec    CAPILLARY BLOOD GLUCOSE

## 2021-10-05 NOTE — H&P ADULT - PROBLEM SELECTOR PLAN 1
- Patient came c/o epigastric abdominal pain, intensity 10/10  - US abdomen showed cholelithiasis no cholecystitis, CBD 7 mm  - No need to start antibiotics for now, however if patient spikes fever please send Bcx and start antibiotics empirically   - GI Dr Ramirez consulted, planned MRCP tomorrow---- as per MR tech patient does not need to be NPO for procedure

## 2021-10-05 NOTE — H&P ADULT - NSHPPHYSICALEXAM_GEN_ALL_CORE
ICU Vital Signs Last 24 Hrs  T(C): 36.8 (05 Oct 2021 15:38), Max: 37.1 (05 Oct 2021 08:57)  T(F): 98.3 (05 Oct 2021 15:38), Max: 98.7 (05 Oct 2021 08:57)  HR: 108 (05 Oct 2021 15:38) (76 - 108)  BP: 114/68 (05 Oct 2021 15:38) (114/68 - 136/76)  RR: 18 (05 Oct 2021 15:38) (18 - 18)  SpO2: 96% (05 Oct 2021 15:38) (96% - 98%)    GENERAL: NAD, overweight   HEAD:  Atraumatic, Normocephalic  EYES:  conjunctiva and sclera clear  NECK: Supple, No JVD, Normal thyroid  CHEST/LUNG: Clear to auscultation. Clear to percussion bilaterally; No rales, rhonchi, wheezing, or rubs  HEART: Regular rate and rhythm; No murmurs, rubs, or gallops  ABDOMEN: Soft, Nontender, Nondistended; Bowel sounds present, no pain or masses on palpation   NERVOUS SYSTEM:  Alert & Oriented X3  EXTREMITIES:  2+ Peripheral Pulses, No clubbing, cyanosis, or edema  : voiding well   SKIN: warm, dry ICU Vital Signs Last 24 Hrs  T(C): 36.8 (05 Oct 2021 15:38), Max: 37.1 (05 Oct 2021 08:57)  T(F): 98.3 (05 Oct 2021 15:38), Max: 98.7 (05 Oct 2021 08:57)  HR: 108 (05 Oct 2021 15:38) (76 - 108)  BP: 114/68 (05 Oct 2021 15:38) (114/68 - 136/76)  RR: 18 (05 Oct 2021 15:38) (18 - 18)  SpO2: 96% (05 Oct 2021 15:38) (96% - 98%)    GENERAL: NAD, overweight, sat well on RA  HEAD:  Atraumatic, Normocephalic  EYES:  conjunctiva and sclera clear  NECK: Supple, No JVD, Normal thyroid  CHEST/LUNG: Clear to auscultation. Clear to percussion bilaterally; No rales, rhonchi, wheezing, or rubs  HEART: Regular rate and rhythm; No murmurs, rubs, or gallops  ABDOMEN: Soft, Nontender, Nondistended; Bowel sounds present, mild pain on epigastric area, no masses on palpation   NERVOUS SYSTEM:  Alert & Oriented X3, no neuro deficits   EXTREMITIES:  2+ Peripheral Pulses, No clubbing, cyanosis, or edema  : voiding well   SKIN: warm, dry

## 2021-10-05 NOTE — H&P ADULT - HISTORY OF PRESENT ILLNESS
80 year old male, from home, ambulates independently, has past medical hx of CKD5 s/p kidney transplant on 2016, hypertension, diabetes came to ED c/o epigastric pain that started last night after eating crab meat. Patient refers pain was constant and severe 10/10, non radiating, associated to nausea, no vomiting or diarrhea. Patient denies any similar episodes in the past. Patient denies chest pain, sob, dizziness, palpitations, diarrhea, bleeding, fever, chills or sick contacts.    GOC full code

## 2021-10-05 NOTE — ED ADULT TRIAGE NOTE - NS ED NURSE BANDS TYPE
Patient is awake, alert and oriented x4. Sitting up in the chair. On Vapo T 40 L 100%, sats 88-92% while supine and 97% while prone. Coughing up white thin secretions. Can do IS up to 1000. Vital signs are stable at this time. Prone position since 2100.  Wi Name band;

## 2021-10-05 NOTE — ED PROVIDER NOTE - OBJECTIVE STATEMENT
81 y/o Male from home with PMH of DM and kidney transplant complains of epigastric pain since yesterday. Pain began after eating crab last night, 10/10 at its worst, nonradiating, and associated with nausea without vomiting or diarrhea. Patient denies headache, dizziness, chest pain, SOB, or  complaints. Patient denies smoking or alcohol use

## 2021-10-05 NOTE — H&P ADULT - PROBLEM SELECTOR PLAN 2
-Patient with elevated LFTS most likely 2/2 cholelithiasis   - F/u GGT, Bilirubin, Hep B, C  - US abdomen and CT abdomen noted   - Monitor LFTs daily  - GI Dr Ramirez consulted, planned MRCP tomorrow

## 2021-10-06 LAB
A1C WITH ESTIMATED AVERAGE GLUCOSE RESULT: 6.4 % — HIGH (ref 4–5.6)
ALBUMIN SERPL ELPH-MCNC: 3.2 G/DL — LOW (ref 3.5–5)
ALP SERPL-CCNC: 147 U/L — HIGH (ref 40–120)
ALT FLD-CCNC: 158 U/L DA — HIGH (ref 10–60)
ANION GAP SERPL CALC-SCNC: 11 MMOL/L — SIGNIFICANT CHANGE UP (ref 5–17)
APPEARANCE UR: CLEAR — SIGNIFICANT CHANGE UP
APTT BLD: 34.7 SEC — SIGNIFICANT CHANGE UP (ref 27.5–35.5)
AST SERPL-CCNC: 118 U/L — HIGH (ref 10–40)
BACTERIA # UR AUTO: ABNORMAL /HPF
BILIRUB DIRECT SERPL-MCNC: 3.5 MG/DL — HIGH (ref 0–0.2)
BILIRUB INDIRECT FLD-MCNC: 3.5 MG/DL — HIGH (ref 0.2–1)
BILIRUB SERPL-MCNC: 7 MG/DL — HIGH (ref 0.2–1.2)
BILIRUB SERPL-MCNC: 7 MG/DL — HIGH (ref 0.2–1.2)
BILIRUB UR-MCNC: ABNORMAL
BUN SERPL-MCNC: 23 MG/DL — HIGH (ref 7–18)
CALCIUM SERPL-MCNC: 8.7 MG/DL — SIGNIFICANT CHANGE UP (ref 8.4–10.5)
CHLORIDE SERPL-SCNC: 102 MMOL/L — SIGNIFICANT CHANGE UP (ref 96–108)
CO2 SERPL-SCNC: 25 MMOL/L — SIGNIFICANT CHANGE UP (ref 22–31)
COLOR SPEC: YELLOW — SIGNIFICANT CHANGE UP
COMMENT - URINE: SIGNIFICANT CHANGE UP
COVID-19 SPIKE DOMAIN AB INTERP: POSITIVE
COVID-19 SPIKE DOMAIN ANTIBODY RESULT: >250 U/ML — HIGH
CREAT SERPL-MCNC: 1.03 MG/DL — SIGNIFICANT CHANGE UP (ref 0.5–1.3)
DIFF PNL FLD: NEGATIVE — SIGNIFICANT CHANGE UP
EPI CELLS # UR: SIGNIFICANT CHANGE UP /HPF
ESTIMATED AVERAGE GLUCOSE: 137 MG/DL — HIGH (ref 68–114)
GGT SERPL-CCNC: 371 U/L — HIGH (ref 9–50)
GLUCOSE BLDC GLUCOMTR-MCNC: 103 MG/DL — HIGH (ref 70–99)
GLUCOSE BLDC GLUCOMTR-MCNC: 118 MG/DL — HIGH (ref 70–99)
GLUCOSE BLDC GLUCOMTR-MCNC: 128 MG/DL — HIGH (ref 70–99)
GLUCOSE BLDC GLUCOMTR-MCNC: 142 MG/DL — HIGH (ref 70–99)
GLUCOSE SERPL-MCNC: 112 MG/DL — HIGH (ref 70–99)
GLUCOSE UR QL: NEGATIVE — SIGNIFICANT CHANGE UP
HBV SURFACE AG SER-ACNC: SIGNIFICANT CHANGE UP
HCT VFR BLD CALC: 35.3 % — LOW (ref 39–50)
HCV AB S/CO SERPL IA: 0.13 S/CO — SIGNIFICANT CHANGE UP (ref 0–0.99)
HCV AB SERPL-IMP: SIGNIFICANT CHANGE UP
HGB BLD-MCNC: 11.9 G/DL — LOW (ref 13–17)
INR BLD: 1.24 RATIO — HIGH (ref 0.88–1.16)
KETONES UR-MCNC: NEGATIVE — SIGNIFICANT CHANGE UP
LEUKOCYTE ESTERASE UR-ACNC: NEGATIVE — SIGNIFICANT CHANGE UP
MAGNESIUM SERPL-MCNC: 1.4 MG/DL — LOW (ref 1.6–2.6)
MCHC RBC-ENTMCNC: 30.9 PG — SIGNIFICANT CHANGE UP (ref 27–34)
MCHC RBC-ENTMCNC: 33.7 GM/DL — SIGNIFICANT CHANGE UP (ref 32–36)
MCV RBC AUTO: 91.7 FL — SIGNIFICANT CHANGE UP (ref 80–100)
NITRITE UR-MCNC: NEGATIVE — SIGNIFICANT CHANGE UP
NRBC # BLD: 0 /100 WBCS — SIGNIFICANT CHANGE UP (ref 0–0)
PH UR: 6 — SIGNIFICANT CHANGE UP (ref 5–8)
PHOSPHATE SERPL-MCNC: 2.4 MG/DL — LOW (ref 2.5–4.5)
PLATELET # BLD AUTO: 155 K/UL — SIGNIFICANT CHANGE UP (ref 150–400)
POTASSIUM SERPL-MCNC: 3.8 MMOL/L — SIGNIFICANT CHANGE UP (ref 3.5–5.3)
POTASSIUM SERPL-SCNC: 3.8 MMOL/L — SIGNIFICANT CHANGE UP (ref 3.5–5.3)
PROT SERPL-MCNC: 6.3 G/DL — SIGNIFICANT CHANGE UP (ref 6–8.3)
PROT UR-MCNC: 30 MG/DL
PROTHROM AB SERPL-ACNC: 14.6 SEC — HIGH (ref 10.6–13.6)
RBC # BLD: 3.85 M/UL — LOW (ref 4.2–5.8)
RBC # FLD: 14.1 % — SIGNIFICANT CHANGE UP (ref 10.3–14.5)
RBC CASTS # UR COMP ASSIST: SIGNIFICANT CHANGE UP /HPF (ref 0–2)
SARS-COV-2 IGG+IGM SERPL QL IA: >250 U/ML — HIGH
SARS-COV-2 IGG+IGM SERPL QL IA: POSITIVE
SODIUM SERPL-SCNC: 138 MMOL/L — SIGNIFICANT CHANGE UP (ref 135–145)
SP GR SPEC: 1.01 — SIGNIFICANT CHANGE UP (ref 1.01–1.02)
TACROLIMUS SERPL-MCNC: 8.9 NG/ML — SIGNIFICANT CHANGE UP
UROBILINOGEN FLD QL: 4
WBC # BLD: 11.83 K/UL — HIGH (ref 3.8–10.5)
WBC # FLD AUTO: 11.83 K/UL — HIGH (ref 3.8–10.5)
WBC UR QL: SIGNIFICANT CHANGE UP /HPF (ref 0–5)

## 2021-10-06 PROCEDURE — 74181 MRI ABDOMEN W/O CONTRAST: CPT | Mod: 26

## 2021-10-06 RX ORDER — POTASSIUM PHOSPHATE, MONOBASIC POTASSIUM PHOSPHATE, DIBASIC 236; 224 MG/ML; MG/ML
15 INJECTION, SOLUTION INTRAVENOUS ONCE
Refills: 0 | Status: COMPLETED | OUTPATIENT
Start: 2021-10-06 | End: 2021-10-06

## 2021-10-06 RX ORDER — TACROLIMUS 5 MG/1
1 CAPSULE ORAL DAILY
Refills: 0 | Status: DISCONTINUED | OUTPATIENT
Start: 2021-10-07 | End: 2021-10-08

## 2021-10-06 RX ORDER — AMPICILLIN SODIUM AND SULBACTAM SODIUM 250; 125 MG/ML; MG/ML
1.5 INJECTION, POWDER, FOR SUSPENSION INTRAMUSCULAR; INTRAVENOUS EVERY 8 HOURS
Refills: 0 | Status: DISCONTINUED | OUTPATIENT
Start: 2021-10-06 | End: 2021-10-06

## 2021-10-06 RX ORDER — PIPERACILLIN AND TAZOBACTAM 4; .5 G/20ML; G/20ML
3.38 INJECTION, POWDER, LYOPHILIZED, FOR SOLUTION INTRAVENOUS EVERY 8 HOURS
Refills: 0 | Status: DISCONTINUED | OUTPATIENT
Start: 2021-10-06 | End: 2021-10-08

## 2021-10-06 RX ORDER — PIPERACILLIN AND TAZOBACTAM 4; .5 G/20ML; G/20ML
3.38 INJECTION, POWDER, LYOPHILIZED, FOR SOLUTION INTRAVENOUS ONCE
Refills: 0 | Status: COMPLETED | OUTPATIENT
Start: 2021-10-06 | End: 2021-10-06

## 2021-10-06 RX ORDER — MAGNESIUM SULFATE 500 MG/ML
2 VIAL (ML) INJECTION ONCE
Refills: 0 | Status: COMPLETED | OUTPATIENT
Start: 2021-10-06 | End: 2021-10-06

## 2021-10-06 RX ADMIN — POTASSIUM PHOSPHATE, MONOBASIC POTASSIUM PHOSPHATE, DIBASIC 62.5 MILLIMOLE(S): 236; 224 INJECTION, SOLUTION INTRAVENOUS at 11:06

## 2021-10-06 RX ADMIN — TACROLIMUS 1.5 MILLIGRAM(S): 5 CAPSULE ORAL at 07:53

## 2021-10-06 RX ADMIN — MYCOPHENOLATE MOFETIL 750 MILLIGRAM(S): 250 CAPSULE ORAL at 19:07

## 2021-10-06 RX ADMIN — TAMSULOSIN HYDROCHLORIDE 0.4 MILLIGRAM(S): 0.4 CAPSULE ORAL at 21:42

## 2021-10-06 RX ADMIN — HEPARIN SODIUM 5000 UNIT(S): 5000 INJECTION INTRAVENOUS; SUBCUTANEOUS at 13:28

## 2021-10-06 RX ADMIN — PIPERACILLIN AND TAZOBACTAM 25 GRAM(S): 4; .5 INJECTION, POWDER, LYOPHILIZED, FOR SOLUTION INTRAVENOUS at 21:42

## 2021-10-06 RX ADMIN — PIPERACILLIN AND TAZOBACTAM 200 GRAM(S): 4; .5 INJECTION, POWDER, LYOPHILIZED, FOR SOLUTION INTRAVENOUS at 13:20

## 2021-10-06 RX ADMIN — MYCOPHENOLATE MOFETIL 750 MILLIGRAM(S): 250 CAPSULE ORAL at 05:17

## 2021-10-06 RX ADMIN — Medication 50 GRAM(S): at 11:05

## 2021-10-06 RX ADMIN — Medication 81 MILLIGRAM(S): at 17:22

## 2021-10-06 RX ADMIN — HEPARIN SODIUM 5000 UNIT(S): 5000 INJECTION INTRAVENOUS; SUBCUTANEOUS at 21:42

## 2021-10-06 RX ADMIN — HEPARIN SODIUM 5000 UNIT(S): 5000 INJECTION INTRAVENOUS; SUBCUTANEOUS at 05:17

## 2021-10-06 RX ADMIN — TACROLIMUS 1 MILLIGRAM(S): 5 CAPSULE ORAL at 19:07

## 2021-10-06 NOTE — CONSULT NOTE ADULT - MUSCULOSKELETAL
Recommended artificial tears to use: 1 drop 4x a day in both eyes. No joint pain, swelling or deformity; no limitation of movement

## 2021-10-06 NOTE — CONSULT NOTE ADULT - ASSESSMENT
80 year old male with abdominal pain     Elevated LFTS/ bilirubin. Obstructive jaundice   No evidence of cholangitis   Would still cover with anbx given rising bili   Scheduled for ERCP tomorrow at 1230       
Acute Cholangitis ?    Plan - Cont Zosyn 3.375 gms iv q8hrs for now  Going for ERCP tomorrow.

## 2021-10-06 NOTE — PROGRESS NOTE ADULT - PROBLEM SELECTOR PLAN 2
-Patient with elevated LFTS most likely 2/2 cholelithiasis   - , Bilirubin total 7.0, Hep B, C  - US abdomen and CT abdomen noted   - Monitor LFTs daily  - GI Dr Ramirez consulted, planned MRCP tomorrow

## 2021-10-06 NOTE — PROGRESS NOTE ADULT - SUBJECTIVE AND OBJECTIVE BOX
[facility] : Progress Note :: [phone], (office [phone]),   Dr Mccartney / Dr Carson / Dr Trujillo / Dr Hamilton / Dr Deny BUSBY / Dr Sinclair / Dr Robison / Dr Danial trimble  _____________________________________________________________________________________________    MRCP results noted.    No Known Allergies    Hospital Medications:   MEDICATIONS  (STANDING):  aspirin enteric coated 81 milliGRAM(s) Oral daily  heparin   Injectable 5000 Unit(s) SubCutaneous every 8 hours  influenza   Vaccine 0.5 milliLiter(s) IntraMuscular once  insulin lispro (ADMELOG) corrective regimen sliding scale   SubCutaneous three times a day before meals  insulin lispro (ADMELOG) corrective regimen sliding scale   SubCutaneous at bedtime  mycophenolate mofetil 750 milliGRAM(s) Oral two times a day  piperacillin/tazobactam IVPB.. 3.375 Gram(s) IV Intermittent every 8 hours  sodium chloride 0.9%. 1000 milliLiter(s) (60 mL/Hr) IV Continuous <Continuous>  tacrolimus 1.5 milliGRAM(s) Oral <User Schedule>  tacrolimus 1 milliGRAM(s) Oral <User Schedule>  tamsulosin 0.4 milliGRAM(s) Oral at bedtime        VITALS:  T(F): 99.1 (10-06-21 @ 13:15), Max: 99.1 (10-06-21 @ 13:15)  HR: 88 (10-06-21 @ 13:15)  BP: 102/58 (10-06-21 @ 13:15)  RR: 16 (10-06-21 @ 13:15)  SpO2: 99% (10-06-21 @ 13:15)  Wt(kg): --      PHYSICAL EXAM:  Constitutional: NAD  HEENT: anicteric sclera, oropharynx clear.  Neck: No JVD  Respiratory: CTAB, no wheezes, rales or rhonchi  Cardiovascular: S1, S2, RRR  Gastrointestinal: BS+, soft, NT. RLQ renal allograft non-tender  Extremities:  No peripheral edema  Neurological: A/O x 3, no focal deficits  : No CVA tenderness. No garcia.   Skin: No rashes  Vascular Access: AVF with thrill and bruit    LABS:  10-06    138  |  102  |  23<H>  ----------------------------<  112<H>  3.8   |  25  |  1.03    Ca    8.7      06 Oct 2021 06:46  Phos  2.4     10-06  Mg     1.4     10-06    TPro  6.3  /  Alb  3.2<L>  /  TBili  7.0<H>  /  DBili  3.5<H>  /  AST  118<H>  /  ALT  158<H>  /  AlkPhos  147<H>  10-06    Creatinine Trend: 1.03 <--, 1.14 <--                        11.9   11.83 )-----------( 155      ( 06 Oct 2021 06:46 )             35.3     Urine Studies:  Urinalysis Basic - ( 06 Oct 2021 05:45 )    Color: Yellow / Appearance: Clear / S.010 / pH:   Gluc:  / Ketone: Negative  / Bili: Small / Urobili: 4   Blood:  / Protein: 30 mg/dL / Nitrite: Negative   Leuk Esterase: Negative / RBC: 0-2 /HPF / WBC 0-2 /HPF   Sq Epi:  / Non Sq Epi: Few /HPF / Bacteria: Few /HPF        RADIOLOGY & ADDITIONAL STUDIES:

## 2021-10-06 NOTE — CONSULT NOTE ADULT - SUBJECTIVE AND OBJECTIVE BOX
HPI:  80 year old male, from home, ambulates independently, has past medical hx of CKD5 s/p kidney transplant on , hypertension, diabetes came to ED c/o epigastric pain that started last night after eating crab meat. Patient refers pain was constant and severe 10/10, non radiating, associated to nausea, no vomiting or diarrhea. Patient denies any similar episodes in the past. Patient denies chest pain, sob, dizziness, palpitations, diarrhea, bleeding, fever, chills or sick contacts.    [facility] full code  (05 Oct 2021 15:11)      PAST MEDICAL & SURGICAL HISTORY:  DM (diabetes mellitus)    Hypertension    H/O kidney transplant        No Known Allergies      Meds:  acetaminophen   Tablet .. 650 milliGRAM(s) Oral every 6 hours PRN  aspirin enteric coated 81 milliGRAM(s) Oral daily  heparin   Injectable 5000 Unit(s) SubCutaneous every 8 hours  influenza   Vaccine 0.5 milliLiter(s) IntraMuscular once  insulin lispro (ADMELOG) corrective regimen sliding scale   SubCutaneous three times a day before meals  insulin lispro (ADMELOG) corrective regimen sliding scale   SubCutaneous at bedtime  mycophenolate mofetil 750 milliGRAM(s) Oral two times a day  ondansetron Injectable 4 milliGRAM(s) IV Push every 8 hours PRN  piperacillin/tazobactam IVPB.. 3.375 Gram(s) IV Intermittent every 8 hours  sodium chloride 0.9%. 1000 milliLiter(s) IV Continuous <Continuous>  tacrolimus 1 milliGRAM(s) Oral <User Schedule>  tamsulosin 0.4 milliGRAM(s) Oral at bedtime      SOCIAL HISTORY:  Smoker:  YES / NO        PACK YEARS:                         WHEN QUIT?  ETOH use:  YES / NO               FREQUENCY / QUANTITY:  [employer]lic[employer] Drug use:  YES / NO  Occupation:  Assisted device use (Cane / Walker):  Live with:    FAMILY HISTORY:      VITALS:  Vital Signs Last 24 Hrs  T(C): 37.3 (06 Oct 2021 13:15), Max: 37.3 (06 Oct 2021 13:15)  T(F): 99.1 (06 Oct 2021 13:15), Max: 99.1 (06 Oct 2021 13:15)  HR: 88 (06 Oct 2021 13:15) (86 - 90)  BP: 102/58 (06 Oct 2021 13:15) (102/58 - 114/75)  BP(mean): 68 (06 Oct 2021 13:15) (68 - 68)  RR: 16 (06 Oct 2021 13:15) (16 - 19)  SpO2: 99% (06 Oct 2021 13:15) (96% - 99%)    LABS/DIAGNOSTIC TESTS:                          11.9   11.83 )-----------( 155      ( 06 Oct 2021 06:46 )             35.3     WBC Count: 11.83 K/uL (10-06 @ 06:46)  WBC Count: 11.05 K/uL (10-05 @ 09:41)      10-06    138  |  102  |  23<H>  ----------------------------<  112<H>  3.8   |  25  |  1.03    Ca    8.7      06 Oct 2021 06:46  Phos  2.4     10-06  Mg     1.4     10-06    TPro  6.3  /  Alb  3.2<L>  /  TBili  7.0<H>  /  DBili  3.5<H>  /  AST  118<H>  /  ALT  158<H>  /  AlkPhos  147<H>  10-06      Urinalysis Basic - ( 06 Oct 2021 05:45 )    Color: Yellow / Appearance: Clear / S.010 / pH: x  Gluc: x / Ketone: Negative  / Bili: Small / Urobili: 4   Blood: x / Protein: 30 mg/dL / Nitrite: Negative   Leuk Esterase: Negative / RBC: 0-2 /HPF / WBC 0-2 /HPF   Sq Epi: x / Non Sq Epi: Few /HPF / Bacteria: Few /HPF        LIVER FUNCTIONS - ( 06 Oct 2021 13:43 )  Alb: x     / Pro: x     / ALK PHOS: x     / ALT: x     / AST: x     / GGT: 371 U/L         PT/INR - ( 06 Oct 2021 06:46 )   PT: 14.6 sec;   INR: 1.24 ratio         PTT - ( 06 Oct 2021 06:46 )  PTT:34.7 sec    LACTATE:    ABG -     CULTURES:       RADIOLOGY:< from: MR MRCP No Cont (10.06.21 @ 12:44) >  EXAM:  MR MRCP                            PROCEDURE DATE:  10/06/2021          INTERPRETATION:  CLINICAL INFORMATION: Abdominal pain    COMPARISON: CT and ultrasound dated 10/5/2021    CONTRAST/COMPLICATIONS:  IV Contrast: NONE  Oral Contrast: NONE  Complications: None reported at time of study completion    PROCEDURE:  MRI of the abdomen was performed.  MRCP was performed.    FINDINGS:  LOWER CHEST: Within normal limits.    LIVER: Within normal limits.  BILE DUCTS: Numerous calculi within the cystic and common bile ducts, largest measuring up to 5 mm. CBD dilatation up to 11 mm.  GALLBLADDER: Cholelithiasis.  SPLEEN: Indeterminate T2 hyperintensities measuring 0.9 cm and 1.2 cm.  PANCREAS: Within normal limits.  ADRENALS: Within normal limits.  KIDNEYS/URETERS: Bilateral renal cysts, largest in the native right kidney measures 2.6 cm. Right pelvic renal transplant demonstrating mild hydronephrosis.    VISUALIZED PORTIONS:  BOWEL: Colonic diverticulosis.  PERITONEUM: No ascites.  VESSELS: Atherosclerotic changes.  RETROPERITONEUM/LYMPH NODES: No lymphadenopathy.  ABDOMINAL WALL: Within normal limits.  BONES: Degenerative changes.    IMPRESSION:  Cholelithiasis and extensive choledocholithiasis (numerous calculi measuring up to 5 mm within the cystic and common bile ducts).  CBD dilatation up to 11 mm.  Right pelvic renal transplant demonstrating mild hydronephrosis.        --- End of Report ---            JACQUELINE LOAIZA MD; Attending Radiologist  This document has been electronically signed. Oct  6 2021  1:50PM    < end of copied text >  --------------------------------------------------------------------------------------------------------------------------------------------------------------------------------------------------------  EXAM:  CT ABDOMEN AND PELVIS IC                            PROCEDURE DATE:  10/05/2021          INTERPRETATION:  CLINICAL INFORMATION: Upper abdominal pain    COMPARISON: 2007    CONTRAST/COMPLICATIONS:  IV Contrast: Omnipaque 350  90 cc administered   10 cc discarded  Oral Contrast: NONE  Complications: None reported at time of study completion    PROCEDURE:  CT of the Abdomen and Pelvis was performed.  Sagittal and coronal reformats were performed.    FINDINGS:  LOWER CHEST: Small bilateral atelectasis. Small calcified granuloma in the right lung base.    LIVER: Hepatic steatosis.  BILE DUCTS: The common duct is dilated measuring up to 1.1 cm in caliber, new since the previous examination. Low medial insertion of the cystic duct.  GALLBLADDER: Within normal limits.  SPLEEN: 2 indeterminate hypodense lesions in the spleen measuring 1.3 cm and 1.2 cm.  PANCREAS: Within normal limits.  ADRENALS: Within normal limits.  KIDNEYS/URETERS: The native kidneys are atrophic bilaterally. Cysts in the bilateral native kidneys measuring up to 2.3 cm on the right. There are other small hypodense lesions in the native kidneys bilaterally, too small to characterize. There is a right lower quadrant transplant kidney. Mild hydronephrosis of theright transplant kidney may be due to compression of the proximal transplant ureter between the right external iliac artery and the renal transplant. No evidence for a calculus in the transplant ureter.    BLADDER: Within normal limits.  REPRODUCTIVEORGANS: The prostate is enlarged.    BOWEL: No bowel obstruction, or grossly thickened bowel wall. Colonic diverticulosis without evidence for diverticulitis. Appendix within normal limits.  PERITONEUM: No free air or ascites.  VESSELS: Calcified atherosclerotic disease.  RETROPERITONEUM/LYMPH NODES: No lymphadenopathy.  ABDOMINAL WALL: Within normal limits.  BONES: Degenerative spondylosis.    IMPRESSION:  The common duct is dilated measuring up to 1.1 cm in caliber, new since the previous examination. 2 small indeterminate hypodense lesions in the spleen. If clinically indicated, abdominal MR without and with IV contrast including MRCP may be pursued for further evaluation.    Mild hydronephrosis of the right transplant kidney may be due tocompression of the proximal transplant ureter between the right external iliac artery and the renal transplant. No evidence for a calculus in the transplant ureter.    --- End of Report ---            STEPHANIA US MD; Attending Radiologist  This document has been electronically signed. Oct  5 2021  3:49PM    < end of copied text >        ROS  [  ] UNABLE TO ELICIT               HPI:  80 year old male, from home, ambulates independently, has past medical hx of CKD5 s/p kidney transplant on , hypertension, diabetes came to ED c/o epigastric pain that started last night after eating crab meat. Patient refers pain was constant and severe 10/10, non radiating, associated to nausea, no vomiting or diarrhea. Patient denies any similar episodes in the past. Patient denies chest pain, sob, dizziness, palpitations, diarrhea, bleeding, fever, chills or sick contacts.    History as above, whom I was asked to see as he was found to have gallstones and choledocholithiasis and had been admitted with abdominal pain and high LFTs, he does not have signs of acute Cholangitis or Cholecystitis but has been placed on Zosyn empirically for now. He is going to go for an ERCP tomorrow. He is currently eating his dinner and denies having any abdominal pain , no nausea or vomiting or diarrhea. He has no fevers or chills.      PAST MEDICAL & SURGICAL HISTORY:  DM (diabetes mellitus)    Hypertension    H/O kidney transplant        No Known Allergies       Meds:  acetaminophen   Tablet .. 650 milliGRAM(s) Oral every 6 hours PRN  aspirin enteric coated 81 milliGRAM(s) Oral daily  heparin   Injectable 5000 Unit(s) SubCutaneous every 8 hours  influenza   Vaccine 0.5 milliLiter(s) IntraMuscular once  insulin lispro (ADMELOG) corrective regimen sliding scale   SubCutaneous three times a day before meals  insulin lispro (ADMELOG) corrective regimen sliding scale   SubCutaneous at bedtime  mycophenolate mofetil 750 milliGRAM(s) Oral two times a day  ondansetron Injectable 4 milliGRAM(s) IV Push every 8 hours PRN  piperacillin/tazobactam IVPB.. 3.375 Gram(s) IV Intermittent every 8 hours  sodium chloride 0.9%. 1000 milliLiter(s) IV Continuous <Continuous>  tacrolimus 1 milliGRAM(s) Oral <User Schedule>  tamsulosin 0.4 milliGRAM(s) Oral at bedtime      SOCIAL HISTORY:  Smoker:  no  ETOH use:  no    FAMILY HISTORY: not contributory      VITALS:  Vital Signs Last 24 Hrs  T(C): 37.3 (06 Oct 2021 13:15), Max: 37.3 (06 Oct 2021 13:15)  T(F): 99.1 (06 Oct 2021 13:15), Max: 99.1 (06 Oct 2021 13:15)  HR: 88 (06 Oct 2021 13:15) (86 - 90)  BP: 102/58 (06 Oct 2021 13:15) (102/58 - 114/75)  BP(mean): 68 (06 Oct 2021 13:15) (68 - 68)  RR: 16 (06 Oct 2021 13:15) (16 - 19)  SpO2: 99% (06 Oct 2021 13:15) (96% - 99%)    LABS/DIAGNOSTIC TESTS:                          11.9   11.83 )-----------( 155      ( 06 Oct 2021 06:46 )             35.3     WBC Count: 11.83 K/uL (10-06 @ 06:46)  WBC Count: 11.05 K/uL (10-05 @ 09:41)      10    138  |  102  |  23<H>  ----------------------------<  112<H>  3.8   |  25  |  1.03    Ca    8.7      06 Oct 2021 06:46  Phos  2.4     10  Mg     1.4     10-06    TPro  6.3  /  Alb  3.2<L>  /  TBili  7.0<H>  /  DBili  3.5<H>  /  AST  118<H>  /  ALT  158<H>  /  AlkPhos  147<H>  10      Urinalysis Basic - ( 06 Oct 2021 05:45 )    Color: Yellow / Appearance: Clear / S.010 / pH: x  Gluc: x / Ketone: Negative  / Bili: Small / Urobili: 4   Blood: x / Protein: 30 mg/dL / Nitrite: Negative   Leuk Esterase: Negative / RBC: 0-2 /HPF / WBC 0-2 /HPF   Sq Epi: x / Non Sq Epi: Few /HPF / Bacteria: Few /HPF        LIVER FUNCTIONS - ( 06 Oct 2021 13:43 )  Alb: x     / Pro: x     / ALK PHOS: x     / ALT: x     / AST: x     / GGT: 371 U/L         PT/INR - ( 06 Oct 2021 06:46 )   PT: 14.6 sec;   INR: 1.24 ratio         PTT - ( 06 Oct 2021 06:46 )  PTT:34.7 sec    LACTATE:    ABG -     CULTURES:       RADIOLOGY:< from: MR MRCP No Cont (10.06.21 @ 12:44) >  EXAM:  MR MRCP                            PROCEDURE DATE:  10/06/2021          INTERPRETATION:  CLINICAL INFORMATION: Abdominal pain    COMPARISON: CT and ultrasound dated 10/5/2021    CONTRAST/COMPLICATIONS:  IV Contrast: NONE  Oral Contrast: NONE  Complications: None reported at time of study completion    PROCEDURE:  MRI of the abdomen was performed.  MRCP was performed.    FINDINGS:  LOWER CHEST: Within normal limits.    LIVER: Within normal limits.  BILE DUCTS: Numerous calculi within the cystic and common bile ducts, largest measuring up to 5 mm. CBD dilatation up to 11 mm.  GALLBLADDER: Cholelithiasis.  SPLEEN: Indeterminate T2 hyperintensities measuring 0.9 cm and 1.2 cm.  PANCREAS: Within normal limits.  ADRENALS: Within normal limits.  KIDNEYS/URETERS: Bilateral renal cysts, largest in the native right kidney measures 2.6 cm. Right pelvic renal transplant demonstrating mild hydronephrosis.    VISUALIZED PORTIONS:  BOWEL: Colonic diverticulosis.  PERITONEUM: No ascites.  VESSELS: Atherosclerotic changes.  RETROPERITONEUM/LYMPH NODES: No lymphadenopathy.  ABDOMINAL WALL: Within normal limits.  BONES: Degenerative changes.    IMPRESSION:  Cholelithiasis and extensive choledocholithiasis (numerous calculi measuring up to 5 mm within the cystic and common bile ducts).  CBD dilatation up to 11 mm.  Right pelvic renal transplant demonstrating mild hydronephrosis.        --- End of Report ---            JACQUELINE LOAIZA MD; Attending Radiologist  This document has been electronically signed. Oct  6 2021  1:50PM    < end of copied text >  --------------------------------------------------------------------------------------------------------------------------------------------------------------------------------------------------------  EXAM:  CT ABDOMEN AND PELVIS IC                            PROCEDURE DATE:  10/05/2021          INTERPRETATION:  CLINICAL INFORMATION: Upper abdominal pain    COMPARISON: 2007    CONTRAST/COMPLICATIONS:  IV Contrast: Omnipaque 350  90 cc administered   10 cc discarded  Oral Contrast: NONE  Complications: None reported at time of study completion    PROCEDURE:  CT of the Abdomen and Pelvis was performed.  Sagittal and coronal reformats were performed.    FINDINGS:  LOWER CHEST: Small bilateral atelectasis. Small calcified granuloma in the right lung base.    LIVER: Hepatic steatosis.  BILE DUCTS: The common duct is dilated measuring up to 1.1 cm in caliber, new since the previous examination. Low medial insertion of the cystic duct.  GALLBLADDER: Within normal limits.  SPLEEN: 2 indeterminate hypodense lesions in the spleen measuring 1.3 cm and 1.2 cm.  PANCREAS: Within normal limits.  ADRENALS: Within normal limits.  KIDNEYS/URETERS: The native kidneys are atrophic bilaterally. Cysts in the bilateral native kidneys measuring up to 2.3 cm on the right. There are other small hypodense lesions in the native kidneys bilaterally, too small to characterize. There is a right lower quadrant transplant kidney. Mild hydronephrosis of theright transplant kidney may be due to compression of the proximal transplant ureter between the right external iliac artery and the renal transplant. No evidence for a calculus in the transplant ureter.    BLADDER: Within normal limits.  REPRODUCTIVEORGANS: The prostate is enlarged.    BOWEL: No bowel obstruction, or grossly thickened bowel wall. Colonic diverticulosis without evidence for diverticulitis. Appendix within normal limits.  PERITONEUM: No free air or ascites.  VESSELS: Calcified atherosclerotic disease.  RETROPERITONEUM/LYMPH NODES: No lymphadenopathy.  ABDOMINAL WALL: Within normal limits.  BONES: Degenerative spondylosis.    IMPRESSION:  The common duct is dilated measuring up to 1.1 cm in caliber, new since the previous examination. 2 small indeterminate hypodense lesions in the spleen. If clinically indicated, abdominal MR without and with IV contrast including MRCP may be pursued for further evaluation.    Mild hydronephrosis of the right transplant kidney may be due tocompression of the proximal transplant ureter between the right external iliac artery and the renal transplant. No evidence for a calculus in the transplant ureter.    --- End of Report ---            STEPHANIA US MD; Attending Radiologist  This document has been electronically signed. Oct  5 2021  3:49PM    < end of copied text >        ROS  [  ] UNABLE TO ELICIT

## 2021-10-06 NOTE — PROGRESS NOTE ADULT - SUBJECTIVE AND OBJECTIVE BOX
PGY-1 Progress Note discussed with attending    PAGER #: [phone]] TILL 5:00 PM  PLEASE CONTACT ON CALL TEAM:  - On Call Team (Please refer to Fidelia) FROM 5:00 PM - 8:30PM  - Nightfloat Team FROM 8:30 -7:30 AM    HPI      INTERVAL HPI/OVERNIGHT EVENTS:   -     REVIEW OF SYSTEMS:  CONSTITUTIONAL: No fever, weight loss, or fatigue  RESPIRATORY: No cough, wheezing, chills or hemoptysis; No shortness of breath  CARDIOVASCULAR: No chest pain, palpitations, dizziness, or leg swelling  GASTROINTESTINAL: No abdominal pain. No nausea, vomiting, or hematemesis; No diarrhea or constipation. No melena or hematochezia.  GENITOURINARY: No dysuria or hematuria, urinary frequency  NEUROLOGICAL: No headaches, memory loss, loss of strength, numbness, or tremors  SKIN: No itching, burning, rashes, or lesions     MEDICATIONS  (STANDING):  aspirin enteric coated 81 milliGRAM(s) Oral daily  heparin   Injectable 5000 Unit(s) SubCutaneous every 8 hours  influenza   Vaccine 0.5 milliLiter(s) IntraMuscular once  insulin lispro (ADMELOG) corrective regimen sliding scale   SubCutaneous three times a day before meals  insulin lispro (ADMELOG) corrective regimen sliding scale   SubCutaneous at bedtime  mycophenolate mofetil 750 milliGRAM(s) Oral two times a day  sodium chloride 0.9%. 1000 milliLiter(s) (60 mL/Hr) IV Continuous <Continuous>  tacrolimus 1.5 milliGRAM(s) Oral <User Schedule>  tacrolimus 1 milliGRAM(s) Oral <User Schedule>  tamsulosin 0.4 milliGRAM(s) Oral at bedtime    MEDICATIONS  (PRN):  acetaminophen   Tablet .. 650 milliGRAM(s) Oral every 6 hours PRN Temp greater or equal to 38C (100.4F), Mild Pain (1 - 3)  ondansetron Injectable 4 milliGRAM(s) IV Push every 8 hours PRN Nausea and/or Vomiting      Vital Signs Last 24 Hrs  T(C): 36.9 (05 Oct 2021 20:25), Max: 37.1 (05 Oct 2021 08:57)  T(F): 98.4 (05 Oct 2021 20:25), Max: 98.7 (05 Oct 2021 08:57)  HR: 86 (05 Oct 2021 20:25) (76 - 108)  BP: 110/64 (05 Oct 2021 20:25) (110/64 - 136/76)  BP(mean): --  RR: 18 (05 Oct 2021 20:25) (18 - 19)  SpO2: 96% (05 Oct 2021 20:25) (96% - 98%)    PHYSICAL EXAMINATION:  GENERAL: NAD, AAOx  HEAD: AT/NC  EYES: conjunctiva and sclera clear  NECK: supple, No JVD noted, Normal thyroid  CHEST/LUNG: CTABL; no rales, rhonchi, wheezing, or rubs  HEART: regular rate and rhythm; no murmurs, rubs, or gallops  ABDOMEN: soft, nontender, nondistended; Bowel sounds present  EXTREMITIES:  2+ Peripheral Pulses, No clubbing, cyanosis, or edema  SKIN: warm dry                          11.9   11.83 )-----------( 155      ( 06 Oct 2021 06:46 )             35.3     10-06    138  |  102  |  23<H>  ----------------------------<  112<H>  3.8   |  25  |  1.03    Ca    8.7      06 Oct 2021 06:46  Phos  2.4     10-06  Mg     1.4     10-06    TPro  6.3  /  Alb  3.2<L>  /  TBili  7.0<H>  /  DBili  3.5<H>  /  AST  118<H>  /  ALT  158<H>  /  AlkPhos  147<H>  10-06    LIVER FUNCTIONS - ( 06 Oct 2021 06:46 )  Alb: 3.2 g/dL / Pro: 6.3 g/dL / ALK PHOS: 147 U/L / ALT: 158 U/L DA / AST: 118 U/L / GGT: x               PT/INR - ( 06 Oct 2021 06:46 )   PT: 14.6 sec;   INR: 1.24 ratio         PTT - ( 06 Oct 2021 06:46 )  PTT:34.7 sec  COVID-19 PCR: NotDetec (05 Oct 2021 09:41)      CAPILLARY BLOOD GLUCOSE      POCT Blood Glucose.: 103 mg/dL (06 Oct 2021 07:46)  POCT Blood Glucose.: 139 mg/dL (05 Oct 2021 21:20)  POCT Blood Glucose.: 143 mg/dL (05 Oct 2021 17:11)      RADIOLOGY & ADDITIONAL TESTS:                   PGY-1 Progress Note discussed with attending    PAGER #: [phone]] TILL 5:00 PM  PLEASE CONTACT ON CALL TEAM:  - On Call Team (Please refer to Fidelia) FROM 5:00 PM - 8:30PM  - Nightfloat Team FROM 8:30 -7:30 AM    HPI  80 year old male, from home, ambulates independently, has past medical hx of CKD5 s/p kidney transplant on 2016, hypertension, diabetes came to ED c/o epigastric pain that started last night after eating crab meat. Patient refers pain was constant and severe 10/10, non radiating, associated to nausea, no vomiting or diarrhea. Patient denies any similar episodes in the past. Patient denies chest pain, sob, dizziness, palpitations, diarrhea, bleeding, fever, chills or sick contacts.    INTERVAL HPI/OVERNIGHT EVENTS:   - AAOx3. Patient denies any symptoms. States his abdominal pain and nausea has dissipated.    REVIEW OF SYSTEMS:  CONSTITUTIONAL: No fever, weight loss, or fatigue  RESPIRATORY: No cough, wheezing, chills or hemoptysis; No shortness of breath  CARDIOVASCULAR: No chest pain, palpitations, dizziness, or leg swelling  GASTROINTESTINAL: No abdominal pain. No nausea, vomiting, or hematemesis; No diarrhea or constipation. No melena or hematochezia.  GENITOURINARY: No dysuria or hematuria, urinary frequency  NEUROLOGICAL: No headaches, memory loss, loss of strength, numbness, or tremors  SKIN: No itching, burning, rashes, or lesions     MEDICATIONS  (STANDING):  aspirin enteric coated 81 milliGRAM(s) Oral daily  heparin   Injectable 5000 Unit(s) SubCutaneous every 8 hours  influenza   Vaccine 0.5 milliLiter(s) IntraMuscular once  insulin lispro (ADMELOG) corrective regimen sliding scale   SubCutaneous three times a day before meals  insulin lispro (ADMELOG) corrective regimen sliding scale   SubCutaneous at bedtime  mycophenolate mofetil 750 milliGRAM(s) Oral two times a day  sodium chloride 0.9%. 1000 milliLiter(s) (60 mL/Hr) IV Continuous <Continuous>  tacrolimus 1.5 milliGRAM(s) Oral <User Schedule>  tacrolimus 1 milliGRAM(s) Oral <User Schedule>  tamsulosin 0.4 milliGRAM(s) Oral at bedtime    MEDICATIONS  (PRN):  acetaminophen   Tablet .. 650 milliGRAM(s) Oral every 6 hours PRN Temp greater or equal to 38C (100.4F), Mild Pain (1 - 3)  ondansetron Injectable 4 milliGRAM(s) IV Push every 8 hours PRN Nausea and/or Vomiting      Vital Signs Last 24 Hrs  T(C): 36.9 (05 Oct 2021 20:25), Max: 37.1 (05 Oct 2021 08:57)  T(F): 98.4 (05 Oct 2021 20:25), Max: 98.7 (05 Oct 2021 08:57)  HR: 86 (05 Oct 2021 20:25) (76 - 108)  BP: 110/64 (05 Oct 2021 20:25) (110/64 - 136/76)  BP(mean): --  RR: 18 (05 Oct 2021 20:25) (18 - 19)  SpO2: 96% (05 Oct 2021 20:25) (96% - 98%)    PHYSICAL EXAMINATION:  GENERAL: NAD, AAOx3  HEAD: AT/NC  EYES: conjunctiva and sclera clear  NECK: supple, No JVD noted, Normal thyroid  CHEST/LUNG: CTABL; no rales, rhonchi, wheezing, or rubs  HEART: regular rate and rhythm; no murmurs, rubs, or gallops  ABDOMEN: soft, nontender, nondistended; Bowel sounds present  EXTREMITIES:  2+ Peripheral Pulses, No clubbing, cyanosis, or edema  SKIN: warm dry                          11.9   11.83 )-----------( 155      ( 06 Oct 2021 06:46 )             35.3     10-06    138  |  102  |  23<H>  ----------------------------<  112<H>  3.8   |  25  |  1.03    Ca    8.7      06 Oct 2021 06:46  Phos  2.4     10-06  Mg     1.4     10-06    TPro  6.3  /  Alb  3.2<L>  /  TBili  7.0<H>  /  DBili  3.5<H>  /  AST  118<H>  /  ALT  158<H>  /  AlkPhos  147<H>  10-06    LIVER FUNCTIONS - ( 06 Oct 2021 06:46 )  Alb: 3.2 g/dL / Pro: 6.3 g/dL / ALK PHOS: 147 U/L / ALT: 158 U/L DA / AST: 118 U/L / GGT: x               PT/INR - ( 06 Oct 2021 06:46 )   PT: 14.6 sec;   INR: 1.24 ratio         PTT - ( 06 Oct 2021 06:46 )  PTT:34.7 sec  COVID-19 PCR: NotDetec (05 Oct 2021 09:41)      CAPILLARY BLOOD GLUCOSE      POCT Blood Glucose.: 103 mg/dL (06 Oct 2021 07:46)  POCT Blood Glucose.: 139 mg/dL (05 Oct 2021 21:20)  POCT Blood Glucose.: 143 mg/dL (05 Oct 2021 17:11)      RADIOLOGY & ADDITIONAL TESTS:    MRCP 10/6  Cholelithiasis and extensive choledocholithiasis (numerous calculi measuring up to 5 mm within the cystic and common bile ducts).  CBD dilatation up to 11 mm.  Right pelvic renal transplant demonstrating mild hydronephrosis.

## 2021-10-06 NOTE — CONSULT NOTE ADULT - SUBJECTIVE AND OBJECTIVE BOX
Patient is a 80y old  Male who presents with a chief complaint of CHOLELITHIASIS (06 Oct 2021 15:17)     .     HPI:    HPI:  80 year old male, from home, ambulates independently, has past medical hx of CKD5 s/p kidney transplant on , hypertension, diabetes came to ED c/o epigastric pain that started last night after eating crab meat. Patient refers pain was constant and severe 10/10, non radiating, associated to nausea, no vomiting or diarrhea. Patient denies any similar episodes in the past. Patient denies chest pain, sob, dizziness, palpitations, diarrhea, bleeding, fever, chills or sick contacts.    [facility] full code  (05 Oct 2021 15:11)          REVIEW OF SYSTEMS  Constitutional:   No fever, no fatigue, no pallor, no night sweats, no weight loss.  HEENT:   No eye pain, no vision changes, no icterus, no mouth ulcers.  Respiratory:   No shortness of breath, no cough, no respiratory distress.   Cardiovascular:   No chest pain, no palpitations.   Gastrointestinal: + abdominal pain, no nausea, no vomiting , no diahrrea, no constipation, no hematochezia,no melena.  Skin:   No rashes, no jaundice, no eczema.   Musculoskeletal:   No joint pain, no swelling, no myalgia.   Neurologic:   No headache, no seizure, no weakness.   Genitourinary:   No dysuria, no decreased urine output.  Psychiatric:  No depression, no anxiety,   Endocrine:   No thyroid disease, no diabetes.  Heme/Lymphatic:   No anemia, no blood transfusions, no lymph node enlargement, no bleeding, no bruising.  ___________________________________________________________________________________________  Allergies    No Known Allergies    Intolerances      MEDICATIONS  (STANDING):  aspirin enteric coated 81 milliGRAM(s) Oral daily  heparin   Injectable 5000 Unit(s) SubCutaneous every 8 hours  influenza   Vaccine 0.5 milliLiter(s) IntraMuscular once  insulin lispro (ADMELOG) corrective regimen sliding scale   SubCutaneous three times a day before meals  insulin lispro (ADMELOG) corrective regimen sliding scale   SubCutaneous at bedtime  mycophenolate mofetil 750 milliGRAM(s) Oral two times a day  piperacillin/tazobactam IVPB.. 3.375 Gram(s) IV Intermittent every 8 hours  sodium chloride 0.9%. 1000 milliLiter(s) (60 mL/Hr) IV Continuous <Continuous>  tacrolimus 1 milliGRAM(s) Oral <User Schedule>  tamsulosin 0.4 milliGRAM(s) Oral at bedtime    MEDICATIONS  (PRN):  acetaminophen   Tablet .. 650 milliGRAM(s) Oral every 6 hours PRN Temp greater or equal to 38C (100.4F), Mild Pain (1 - 3)  ondansetron Injectable 4 milliGRAM(s) IV Push every 8 hours PRN Nausea and/or Vomiting      PAST MEDICAL & SURGICAL HISTORY:  DM (diabetes mellitus)    Hypertension    H/O kidney transplant      FAMILY HISTORY:    Social History: No hsitory of : Tobacco use, IVDA, EToH  ______________________________________________________________________________________    PHYSICAL EXAM    Daily     Daily Weight in k.2 (05 Oct 2021 20:25)  BMI: 29.2 (10- @ 08:57)  Change in Weight:  Vital Signs Last 24 Hrs  T(C): 37.3 (06 Oct 2021 13:15), Max: 37.3 (06 Oct 2021 13:15)  T(F): 99.1 (06 Oct 2021 13:15), Max: 99.1 (06 Oct 2021 13:15)  HR: 88 (06 Oct 2021 13:15) (86 - 90)  BP: 102/58 (06 Oct 2021 13:15) (102/58 - 114/75)  BP(mean): 68 (06 Oct 2021 13:15) (68 - 68)  RR: 16 (06 Oct 2021 13:15) (16 - 19)  SpO2: 99% (06 Oct 2021 13:15) (96% - 99%)    General:  Well developed, well nourished, alert and active, no pallor, NAD.  HEENT:    Normal appearance of conjunctiva, ears, nose, lips, oropharynx, and oral mucosa, anicteric.  Neck:  No masses, no asymmetry.  Lymph Nodes:  No lymphadenopathy.   Cardiovascular:  RRR normal S1/S2, no murmur.  Respiratory:  CTA B/L, normal respiratory effort.   Abdominal:   soft, no masses or tenderness, normoactive BS, NT/ND, no HSM.  Extremities:   No clubbing or cyanosis, normal capillary refill, no edema.   Skin:   No rash, jaundice, lesions, eczema.   Musculoskeletal:  No joint swelling, erythema or tenderness.   Neuro: No focal deficits.   Other:   _______________________________________________________________________________________________  Lab Results:                          11.9   11.83 )-----------( 155      ( 06 Oct 2021 06:46 )             35.3     10    138  |  102  |  23<H>  ----------------------------<  112<H>  3.8   |  25  |  1.03    Ca    8.7      06 Oct 2021 06:46  Phos  2.4     10-06  Mg     1.4     10-06    TPro  6.3  /  Alb  3.2<L>  /  TBili  7.0<H>  /  DBili  3.5<H>  /  AST  118<H>  /  ALT  158<H>  /  AlkPhos  147<H>  10    LIVER FUNCTIONS - ( 06 Oct 2021 13:43 )  Alb: x     / Pro: x     / ALK PHOS: x     / ALT: x     / AST: x     / GGT: 371 U/L       PT/INR - ( 06 Oct 2021 06:46 )   PT: 14.6 sec;   INR: 1.24 ratio         PTT - ( 06 Oct 2021 06:46 )  PTT:34.7 sec  Gamma Glutamyl Transferase, Serum: 371 U/L (10-06 @ 13:43)        Stool Results:          RADIOLOGY RESULTS:  < from: MR MRCP No Cont (10.06.21 @ 12:44) >    EXAM:  MR MRCP                            PROCEDURE DATE:  10/06/2021          INTERPRETATION:  CLINICAL INFORMATION: Abdominal pain    COMPARISON: CT and ultrasound dated 10/5/2021    CONTRAST/COMPLICATIONS:  IV Contrast: NONE  Oral Contrast: NONE  Complications: None reported at time of study completion    PROCEDURE:  MRI of the abdomen was performed.  MRCP was performed.    FINDINGS:  LOWER CHEST: Within normal limits.    LIVER: Within normal limits.  BILE DUCTS: Numerous calculi within the cystic and common bile ducts, largest measuring up to 5 mm. CBD dilatation up to 11 mm.  GALLBLADDER: Cholelithiasis.  SPLEEN: Indeterminate T2 hyperintensities measuring 0.9 cm and 1.2 cm.  PANCREAS: Within normal limits.  ADRENALS: Within normal limits.  KIDNEYS/URETERS: Bilateral renal cysts, largest in the native right kidney measures 2.6 cm. Right pelvic renal transplant demonstrating mild hydronephrosis.    VISUALIZED PORTIONS:  BOWEL: Colonic diverticulosis.  PERITONEUM: No ascites.  VESSELS: Atherosclerotic changes.  RETROPERITONEUM/LYMPH NODES: No lymphadenopathy.  ABDOMINAL WALL: Within normal limits.  BONES: Degenerative changes.    IMPRESSION:  Cholelithiasis and extensive choledocholithiasis (numerous calculi measuring up to 5 mm within the cystic and common bile ducts).  CBD dilatation up to 11 mm.  Right pelvic renal transplant demonstrating mild hydronephrosis.        --- End of Report ---            JACQUELINE LOAIZA MD; Attending Radiologist  This document has been electronically signed. Oct  6 2021  1:50PM    < end of copied text >    SURGICAL PATHOLOGY:

## 2021-10-07 LAB
ANION GAP SERPL CALC-SCNC: 8 MMOL/L — SIGNIFICANT CHANGE UP (ref 5–17)
BUN SERPL-MCNC: 16 MG/DL — SIGNIFICANT CHANGE UP (ref 7–18)
CALCIUM SERPL-MCNC: 8.6 MG/DL — SIGNIFICANT CHANGE UP (ref 8.4–10.5)
CHLORIDE SERPL-SCNC: 105 MMOL/L — SIGNIFICANT CHANGE UP (ref 96–108)
CO2 SERPL-SCNC: 25 MMOL/L — SIGNIFICANT CHANGE UP (ref 22–31)
CREAT SERPL-MCNC: 1.03 MG/DL — SIGNIFICANT CHANGE UP (ref 0.5–1.3)
CULTURE RESULTS: SIGNIFICANT CHANGE UP
GLUCOSE BLDC GLUCOMTR-MCNC: 111 MG/DL — HIGH (ref 70–99)
GLUCOSE BLDC GLUCOMTR-MCNC: 113 MG/DL — HIGH (ref 70–99)
GLUCOSE BLDC GLUCOMTR-MCNC: 120 MG/DL — HIGH (ref 70–99)
GLUCOSE BLDC GLUCOMTR-MCNC: 97 MG/DL — SIGNIFICANT CHANGE UP (ref 70–99)
GLUCOSE SERPL-MCNC: 111 MG/DL — HIGH (ref 70–99)
HCT VFR BLD CALC: 35.7 % — LOW (ref 39–50)
HGB BLD-MCNC: 11.7 G/DL — LOW (ref 13–17)
MCHC RBC-ENTMCNC: 29.7 PG — SIGNIFICANT CHANGE UP (ref 27–34)
MCHC RBC-ENTMCNC: 32.8 GM/DL — SIGNIFICANT CHANGE UP (ref 32–36)
MCV RBC AUTO: 90.6 FL — SIGNIFICANT CHANGE UP (ref 80–100)
NRBC # BLD: 0 /100 WBCS — SIGNIFICANT CHANGE UP (ref 0–0)
PLATELET # BLD AUTO: 162 K/UL — SIGNIFICANT CHANGE UP (ref 150–400)
POTASSIUM SERPL-MCNC: 3.8 MMOL/L — SIGNIFICANT CHANGE UP (ref 3.5–5.3)
POTASSIUM SERPL-SCNC: 3.8 MMOL/L — SIGNIFICANT CHANGE UP (ref 3.5–5.3)
RBC # BLD: 3.94 M/UL — LOW (ref 4.2–5.8)
RBC # FLD: 13.9 % — SIGNIFICANT CHANGE UP (ref 10.3–14.5)
SODIUM SERPL-SCNC: 138 MMOL/L — SIGNIFICANT CHANGE UP (ref 135–145)
SPECIMEN SOURCE: SIGNIFICANT CHANGE UP
TACROLIMUS SERPL-MCNC: 8 NG/ML — SIGNIFICANT CHANGE UP
WBC # BLD: 6.15 K/UL — SIGNIFICANT CHANGE UP (ref 3.8–10.5)
WBC # FLD AUTO: 6.15 K/UL — SIGNIFICANT CHANGE UP (ref 3.8–10.5)

## 2021-10-07 RX ORDER — SODIUM CHLORIDE 9 MG/ML
1000 INJECTION, SOLUTION INTRAVENOUS
Refills: 0 | Status: DISCONTINUED | OUTPATIENT
Start: 2021-10-07 | End: 2021-10-08

## 2021-10-07 RX ORDER — INDOMETHACIN 50 MG
100 CAPSULE ORAL ONCE
Refills: 0 | Status: COMPLETED | OUTPATIENT
Start: 2021-10-07 | End: 2021-10-07

## 2021-10-07 RX ADMIN — HEPARIN SODIUM 5000 UNIT(S): 5000 INJECTION INTRAVENOUS; SUBCUTANEOUS at 21:06

## 2021-10-07 RX ADMIN — PIPERACILLIN AND TAZOBACTAM 25 GRAM(S): 4; .5 INJECTION, POWDER, LYOPHILIZED, FOR SOLUTION INTRAVENOUS at 21:06

## 2021-10-07 RX ADMIN — PIPERACILLIN AND TAZOBACTAM 25 GRAM(S): 4; .5 INJECTION, POWDER, LYOPHILIZED, FOR SOLUTION INTRAVENOUS at 05:35

## 2021-10-07 RX ADMIN — Medication 100 MILLIGRAM(S): at 14:24

## 2021-10-07 RX ADMIN — SODIUM CHLORIDE 999 MILLILITER(S): 9 INJECTION, SOLUTION INTRAVENOUS at 18:41

## 2021-10-07 RX ADMIN — MYCOPHENOLATE MOFETIL 750 MILLIGRAM(S): 250 CAPSULE ORAL at 05:35

## 2021-10-07 RX ADMIN — Medication 81 MILLIGRAM(S): at 13:06

## 2021-10-07 RX ADMIN — TAMSULOSIN HYDROCHLORIDE 0.4 MILLIGRAM(S): 0.4 CAPSULE ORAL at 21:06

## 2021-10-07 RX ADMIN — TACROLIMUS 1 MILLIGRAM(S): 5 CAPSULE ORAL at 18:40

## 2021-10-07 RX ADMIN — MYCOPHENOLATE MOFETIL 750 MILLIGRAM(S): 250 CAPSULE ORAL at 18:40

## 2021-10-07 RX ADMIN — Medication 100 MILLIGRAM(S): at 13:54

## 2021-10-07 RX ADMIN — TACROLIMUS 1 MILLIGRAM(S): 5 CAPSULE ORAL at 11:34

## 2021-10-07 NOTE — PROGRESS NOTE ADULT - PROBLEM SELECTOR PLAN 5
- Patient has hx of DM on Metformin at home  - Holding home po medications  - HSS  - Fingerstick before meals and at bedtime  - DASH diet with consistent carb  - Target -180  - A1c 6.4
- Patient has hx of DM on Metformin at home  - Holding home po medications  - HSS  - Fingerstick before meals and at bedtime  - DASH diet with consistent carb  - Target -180  - A1c 6.4

## 2021-10-07 NOTE — CHART NOTE - NSCHARTNOTEFT_GEN_A_CORE
S.p ERCP today. Successful stone extraction. Procedure went as planned however some spillage of contrast into the PD.   Monitor for pancreatitis   IVF   Clear liquids ok
Called by nurse that patient had ERCP today and was not put on diet afterwards.  Starting with clear liquid diet for now.

## 2021-10-07 NOTE — PROGRESS NOTE ADULT - PROBLEM SELECTOR PLAN 1
- Patient came c/o epigastric abdominal pain, intensity 10/10  - US abdomen showed cholelithiasis, no cholecystitis, CBD 7 mm  - started on Zosyn  - GI Dr Ramirez consulted,  MRCP done today, ERCP tmw 10/7  - MRCP see above Cholelithiasis and extensive choledocholithiasis; CBD dilatation up to 11 mm; Right mild    hydronephrosis  - Dr. Ramirez ID consulted
- Patient came c/o epigastric abdominal pain, intensity 10/10  - US abdomen showed cholelithiasis, no cholecystitis, CBD 7 mm  - started on Zosyn  - GI Dr Ramirez consulted,  MRCP done today, ERCP tmw 10/7  - MRCP see above Cholelithiasis and extensive choledocholithiasis; CBD dilatation up to 11 mm; Right mild hydronephrosis  - Dr. Ramirez ID consulted

## 2021-10-07 NOTE — PROGRESS NOTE ADULT - SUBJECTIVE AND OBJECTIVE BOX
HPI:  80 year old male, from home, ambulates independently, has past medical hx of CKD5 s/p kidney transplant on 2016, hypertension, diabetes came to ED c/o epigastric pain that started last night after eating crab meat. Patient refers pain was constant and severe 10/10, non radiating, associated to nausea, no vomiting or diarrhea. Patient denies any similar episodes in the past. Patient denies chest pain, sob, dizziness, palpitations, diarrhea, bleeding, fever, chills or sick contacts.    [facility] full code  (05 Oct 2021 15:11)      Patient is a 80y old  Male who presents with a chief complaint of CHOLELITHIASIS (07 Oct 2021 10:04)      INTERVAL HPI/OVERNIGHT EVENTS:  T(C): 36.6 (10-07-21 @ 13:07), Max: 36.7 (10-06-21 @ 19:49)  HR: 81 (10-07-21 @ 13:07) (78 - 85)  BP: 116/75 (10-07-21 @ 13:07) (116/75 - 124/72)  RR: 17 (10-07-21 @ 13:07) (17 - 18)  SpO2: 98% (10-07-21 @ 13:07) (97% - 98%)  Wt(kg): --  I&O's Summary      REVIEW OF SYSTEMS: denies fever, chills, SOB, palpitations, chest pain, abdominal pain, nausea, vomitting, diarrhea, constipation, dizziness    MEDICATIONS  (STANDING):  aspirin enteric coated 81 milliGRAM(s) Oral daily  heparin   Injectable 5000 Unit(s) SubCutaneous every 8 hours  influenza   Vaccine 0.5 milliLiter(s) IntraMuscular once  insulin lispro (ADMELOG) corrective regimen sliding scale   SubCutaneous three times a day before meals  insulin lispro (ADMELOG) corrective regimen sliding scale   SubCutaneous at bedtime  mycophenolate mofetil 750 milliGRAM(s) Oral two times a day  piperacillin/tazobactam IVPB.. 3.375 Gram(s) IV Intermittent every 8 hours  sodium chloride 0.9%. 1000 milliLiter(s) (60 mL/Hr) IV Continuous <Continuous>  tacrolimus 1 milliGRAM(s) Oral daily  tacrolimus 1 milliGRAM(s) Oral <User Schedule>  tamsulosin 0.4 milliGRAM(s) Oral at bedtime    MEDICATIONS  (PRN):  acetaminophen   Tablet .. 650 milliGRAM(s) Oral every 6 hours PRN Temp greater or equal to 38C (100.4F), Mild Pain (1 - 3)  ondansetron Injectable 4 milliGRAM(s) IV Push every 8 hours PRN Nausea and/or Vomiting      PHYSICAL EXAM:  GENERAL: NAD, well-groomed, well-developed  HEAD:  Atraumatic, Normocephalic  EYES: EOMI, PERRLA, conjunctiva and sclera clear  ENMT: No tonsillar erythema, exudates, or enlargement; Moist mucous membranes, Good dentition, No lesions  NECK: Supple, No JVD, Normal thyroid  NERVOUS SYSTEM:  Alert & Oriented X3, Good concentration; Motor Strength 5/5 B/L upper and lower extremities; DTRs 2+ intact and symmetric  CHEST/LUNG: Clear to percussion bilaterally; No rales, rhonchi, wheezing, or rubs  HEART: Regular rate and rhythm; No murmurs, rubs, or gallops  ABDOMEN: Soft, Nontender, Nondistended; Bowel sounds present  EXTREMITIES:  2+ Peripheral Pulses, No clubbing, cyanosis, or edema  LYMPH: No lymphadenopathy noted  SKIN: No rashes or lesions  LABS:                        11.7   6.15  )-----------( 162      ( 07 Oct 2021 07:10 )             35.7     10-    138  |  105  |  16  ----------------------------<  111<H>  3.8   |  25  |  1.03    Ca    8.6      07 Oct 2021 07:10  Phos  2.4     10-  Mg     1.4     10-    TPro  6.3  /  Alb  3.2<L>  /  TBili  7.0<H>  /  DBili  3.5<H>  /  AST  118<H>  /  ALT  158<H>  /  AlkPhos  147<H>  10-    PT/INR - ( 06 Oct 2021 06:46 )   PT: 14.6 sec;   INR: 1.24 ratio         PTT - ( 06 Oct 2021 06:46 )  PTT:34.7 sec  Urinalysis Basic - ( 06 Oct 2021 05:45 )    Color: Yellow / Appearance: Clear / S.010 / pH: x  Gluc: x / Ketone: Negative  / Bili: Small / Urobili: 4   Blood: x / Protein: 30 mg/dL / Nitrite: Negative   Leuk Esterase: Negative / RBC: 0-2 /HPF / WBC 0-2 /HPF   Sq Epi: x / Non Sq Epi: Few /HPF / Bacteria: Few /HPF      CAPILLARY BLOOD GLUCOSE      POCT Blood Glucose.: 113 mg/dL (07 Oct 2021 11:30)  POCT Blood Glucose.: 120 mg/dL (07 Oct 2021 07:46)  POCT Blood Glucose.: 118 mg/dL (06 Oct 2021 21:11)  POCT Blood Glucose.: 142 mg/dL (06 Oct 2021 16:22)        Urinalysis Basic - ( 06 Oct 2021 05:45 )    Color: Yellow / Appearance: Clear / S.010 / pH: x  Gluc: x / Ketone: Negative  / Bili: Small / Urobili: 4   Blood: x / Protein: 30 mg/dL / Nitrite: Negative   Leuk Esterase: Negative / RBC: 0-2 /HPF / WBC 0-2 /HPF   Sq Epi: x / Non Sq Epi: Few /HPF / Bacteria: Few /HPF

## 2021-10-07 NOTE — PROGRESS NOTE ADULT - PROBLEM SELECTOR PLAN 6
IMPROVE VTE Individual Risk Assessment  RISK                                                                Points  [  ] Previous VTE                                                  3  [  ] Thrombophilia                                               2  [  ] Lower limb paralysis                                      2        (unable to hold up >15 seconds)    [  ] Current Cancer                                              2         (within 6 months)  [  ] Immobilization > 24 hrs                                1  [  ] ICU/CCU stay > 24 hours                              1  [  ] Age > 60                                                      1  IMPROVE VTE Score _____2____    PPI for GI prophylaxis  Heparin for DVT PPX
IMPROVE VTE Individual Risk Assessment  RISK                                                                Points  [  ] Previous VTE                                                  3  [  ] Thrombophilia                                               2  [  ] Lower limb paralysis                                      2        (unable to hold up >15 seconds)    [  ] Current Cancer                                              2         (within 6 months)  [  ] Immobilization > 24 hrs                                1  [  ] ICU/CCU stay > 24 hours                              1  [  ] Age > 60                                                      1  IMPROVE VTE Score _____2____    PPI for GI prophylaxis  Heparin for DVT PPX

## 2021-10-07 NOTE — PROGRESS NOTE ADULT - PROBLEM SELECTOR PLAN 4
- Patient has history of Hypertension not on any meds at home   - DASH diet  - Monitor BP and start meds as needed
- Patient has history of Hypertension not on any meds at home   - DASH diet  - Monitor BP and start meds as needed

## 2021-10-07 NOTE — PROGRESS NOTE ADULT - SUBJECTIVE AND OBJECTIVE BOX
PGY-1 Progress Note discussed with attending    PAGER #: [[phone]] TILL 5:00 PM  PLEASE CONTACT ON CALL TEAM:  - On Call Team (Please refer to Fidelia) FROM 5:00 PM - 8:30PM  - Nightfloat Team FROM 8:30 -7:30 AM    HPI  80 year old male, from home, ambulates independently, has past medical hx of CKD5 s/p kidney transplant on 2016, hypertension, diabetes came to ED c/o epigastric pain that started last night after eating crab meat. Patient refers pain was constant and severe 10/10, non radiating, associated to nausea, no vomiting or diarrhea. Patient denies any similar episodes in the past. Patient denies chest pain, sob, dizziness, palpitations, diarrhea, bleeding, fever, chills or sick contacts.    INTERVAL HPI/OVERNIGHT EVENTS:   - AAOx3. Denies any symptoms. No longer has nausea or abdominal pain.     REVIEW OF SYSTEMS:  CONSTITUTIONAL: No fever, weight loss, or fatigue  RESPIRATORY: No cough, wheezing, chills or hemoptysis; No shortness of breath  CARDIOVASCULAR: No chest pain, palpitations, dizziness, or leg swelling  GASTROINTESTINAL: No abdominal pain. No nausea, vomiting, or hematemesis; No diarrhea or constipation. No melena or hematochezia.  GENITOURINARY: No dysuria or hematuria, urinary frequency  NEUROLOGICAL: No headaches, memory loss, loss of strength, numbness, or tremors  SKIN: No itching, burning, rashes, or lesions     MEDICATIONS  (STANDING):  aspirin enteric coated 81 milliGRAM(s) Oral daily  heparin   Injectable 5000 Unit(s) SubCutaneous every 8 hours  influenza   Vaccine 0.5 milliLiter(s) IntraMuscular once  insulin lispro (ADMELOG) corrective regimen sliding scale   SubCutaneous three times a day before meals  insulin lispro (ADMELOG) corrective regimen sliding scale   SubCutaneous at bedtime  mycophenolate mofetil 750 milliGRAM(s) Oral two times a day  piperacillin/tazobactam IVPB.. 3.375 Gram(s) IV Intermittent every 8 hours  sodium chloride 0.9%. 1000 milliLiter(s) (60 mL/Hr) IV Continuous <Continuous>  tacrolimus 1 milliGRAM(s) Oral daily  tacrolimus 1 milliGRAM(s) Oral <User Schedule>  tamsulosin 0.4 milliGRAM(s) Oral at bedtime    MEDICATIONS  (PRN):  acetaminophen   Tablet .. 650 milliGRAM(s) Oral every 6 hours PRN Temp greater or equal to 38C (100.4F), Mild Pain (1 - 3)  ondansetron Injectable 4 milliGRAM(s) IV Push every 8 hours PRN Nausea and/or Vomiting      Vital Signs Last 24 Hrs  T(C): 36.7 (07 Oct 2021 05:18), Max: 37.3 (06 Oct 2021 13:15)  T(F): 98.1 (07 Oct 2021 05:18), Max: 99.1 (06 Oct 2021 13:15)  HR: 78 (07 Oct 2021 05:18) (78 - 88)  BP: 117/71 (07 Oct 2021 05:18) (102/58 - 124/72)  BP(mean): 68 (06 Oct 2021 13:15) (68 - 68)  RR: 17 (07 Oct 2021 05:18) (16 - 18)  SpO2: 97% (07 Oct 2021 05:18) (97% - 99%)    PHYSICAL EXAMINATION:  GENERAL: NAD, AAOx3  HEAD: AT/NC  EYES: conjunctiva and sclera clear  NECK: supple, No JVD noted, Normal thyroid  CHEST/LUNG: CTABL; no rales, rhonchi, wheezing, or rubs  HEART: regular rate and rhythm; no murmurs, rubs, or gallops  ABDOMEN: soft, nontender, nondistended; Bowel sounds present  EXTREMITIES:  2+ Peripheral Pulses, No clubbing, cyanosis, or edema  SKIN: warm dry                          11.7   6.15  )-----------( 162      ( 07 Oct 2021 07:10 )             35.7     10-07    138  |  105  |  16  ----------------------------<  111<H>  3.8   |  25  |  1.03    Ca    8.6      07 Oct 2021 07:10  Phos  2.4     10-06  Mg     1.4     10-06    TPro  6.3  /  Alb  3.2<L>  /  TBili  7.0<H>  /  DBili  3.5<H>  /  AST  118<H>  /  ALT  158<H>  /  AlkPhos  147<H>  10-06    LIVER FUNCTIONS - ( 06 Oct 2021 13:43 )  Alb: x     / Pro: x     / ALK PHOS: x     / ALT: x     / AST: x     / GGT: 371 U/L           PT/INR - ( 06 Oct 2021 06:46 )   PT: 14.6 sec;   INR: 1.24 ratio         PTT - ( 06 Oct 2021 06:46 )  PTT:34.7 sec  COVID-19 PCR: NotDetec (05 Oct 2021 09:41)      CAPILLARY BLOOD GLUCOSE      POCT Blood Glucose.: 120 mg/dL (07 Oct 2021 07:46)  POCT Blood Glucose.: 118 mg/dL (06 Oct 2021 21:11)  POCT Blood Glucose.: 142 mg/dL (06 Oct 2021 16:22)  POCT Blood Glucose.: 128 mg/dL (06 Oct 2021 12:34)      RADIOLOGY & ADDITIONAL TESTS:

## 2021-10-07 NOTE — PROGRESS NOTE ADULT - SUBJECTIVE AND OBJECTIVE BOX
[facility] : Progress Note :: [phone], (office [phone]),   Dr Mccartney / Dr Carson / Dr Trujillo / Dr Hamilton / Dr Deny BUSBY / Dr Sinclair / Dr Robison / Dr Danial trimble  _____________________________________________________________________________________________     awaits ERCP     No Known Allergies    Hospital Medications:   MEDICATIONS  (STANDING):  aspirin enteric coated 81 milliGRAM(s) Oral daily  heparin   Injectable 5000 Unit(s) SubCutaneous every 8 hours  influenza   Vaccine 0.5 milliLiter(s) IntraMuscular once  insulin lispro (ADMELOG) corrective regimen sliding scale   SubCutaneous three times a day before meals  insulin lispro (ADMELOG) corrective regimen sliding scale   SubCutaneous at bedtime  lactated ringers. 1000 milliLiter(s) (999 mL/Hr) IV Continuous <Continuous>  lactated ringers. 1000 milliLiter(s) (175 mL/Hr) IV Continuous <Continuous>  mycophenolate mofetil 750 milliGRAM(s) Oral two times a day  piperacillin/tazobactam IVPB.. 3.375 Gram(s) IV Intermittent every 8 hours  sodium chloride 0.9%. 1000 milliLiter(s) (60 mL/Hr) IV Continuous <Continuous>  tacrolimus 1 milliGRAM(s) Oral daily  tacrolimus 1 milliGRAM(s) Oral <User Schedule>  tamsulosin 0.4 milliGRAM(s) Oral at bedtime        VITALS:  T(F): 97.9 (10-07-21 @ 13:07), Max: 98.1 (10-06-21 @ 19:49)  HR: 81 (10-07-21 @ 13:07)  BP: 116/75 (10-07-21 @ 13:07)  RR: 17 (10-07-21 @ 13:07)  SpO2: 98% (10-07-21 @ 13:07)  Wt(kg): --      PHYSICAL EXAM:  Constitutional: NAD  HEENT: anicteric sclera, oropharynx clear.  Neck: No JVD  Respiratory: CTAB, no wheezes, rales or rhonchi  Cardiovascular: S1, S2, RRR  Gastrointestinal: BS+, soft, NT/ND  Extremities: No peripheral edema  Neurological: A/O x 3, no focal deficits  : No CVA tenderness. No garcia.   Vascular Access: LUEAVF with thrill and bruit    LABS:  10-07    138  |  105  |  16  ----------------------------<  111<H>  3.8   |  25  |  1.03    Ca    8.6      07 Oct 2021 07:10  Phos  2.4     10-06  Mg     1.4     10-06    TPro  6.3  /  Alb  3.2<L>  /  TBili  7.0<H>  /  DBili  3.5<H>  /  AST  118<H>  /  ALT  158<H>  /  AlkPhos  147<H>  10    Creatinine Trend: 1.03 <--, 1.03 <--, 1.14 <--                        11.7   6.15  )-----------( 162      ( 07 Oct 2021 07:10 )             35.7     Urine Studies:  Urinalysis Basic - ( 06 Oct 2021 05:45 )    Color: Yellow / Appearance: Clear / S.010 / pH:   Gluc:  / Ketone: Negative  / Bili: Small / Urobili: 4   Blood:  / Protein: 30 mg/dL / Nitrite: Negative   Leuk Esterase: Negative / RBC: 0-2 /HPF / WBC 0-2 /HPF   Sq Epi:  / Non Sq Epi: Few /HPF / Bacteria: Few /HPF        RADIOLOGY & ADDITIONAL STUDIES:

## 2021-10-07 NOTE — PROGRESS NOTE ADULT - PROBLEM SELECTOR PLAN 3
- Patient has hx of renal transplantation on Tacrolimus and Mycophenolate  - C/w home meds   - Tacrolimus level 8.9  - Monitor BMP daily  - Nephro Dr Mccartney consulted
- Patient has hx of renal transplantation on Tacrolimus and Mycophenolate  - C/w home meds   - Tacrolimus level 8.9  - Monitor BMP daily  - Nephro Dr Mccartney consulted

## 2021-10-07 NOTE — PROGRESS NOTE ADULT - PROBLEM SELECTOR PLAN 2
-Patient with elevated LFTS most likely 2/2 cholelithiasis   - , Bilirubin total 7.0, Hep B, C  - US abdomen and CT abdomen noted   - Monitor LFTs daily  - GI Dr Ramirez consulted, planned MRCP tomorrow -Patient with elevated LFTS most likely 2/2 cholelithiasis   - , Bilirubin total 7.0, Hep B, C  - US abdomen and CT abdomen noted   - Monitor LFTs daily  - GI Dr Ramirez consulted, MRCP noted  - ERCP today 10/7

## 2021-10-08 ENCOUNTER — TRANSCRIPTION ENCOUNTER (OUTPATIENT)
Age: 81
End: 2021-10-08

## 2021-10-08 VITALS — DIASTOLIC BLOOD PRESSURE: 77 MMHG | SYSTOLIC BLOOD PRESSURE: 126 MMHG

## 2021-10-08 LAB
ANION GAP SERPL CALC-SCNC: 8 MMOL/L — SIGNIFICANT CHANGE UP (ref 5–17)
BUN SERPL-MCNC: 13 MG/DL — SIGNIFICANT CHANGE UP (ref 7–18)
CALCIUM SERPL-MCNC: 8.7 MG/DL — SIGNIFICANT CHANGE UP (ref 8.4–10.5)
CHLORIDE SERPL-SCNC: 107 MMOL/L — SIGNIFICANT CHANGE UP (ref 96–108)
CO2 SERPL-SCNC: 24 MMOL/L — SIGNIFICANT CHANGE UP (ref 22–31)
CREAT SERPL-MCNC: 1.06 MG/DL — SIGNIFICANT CHANGE UP (ref 0.5–1.3)
GLUCOSE BLDC GLUCOMTR-MCNC: 109 MG/DL — HIGH (ref 70–99)
GLUCOSE BLDC GLUCOMTR-MCNC: 115 MG/DL — HIGH (ref 70–99)
GLUCOSE BLDC GLUCOMTR-MCNC: 130 MG/DL — HIGH (ref 70–99)
GLUCOSE BLDC GLUCOMTR-MCNC: 98 MG/DL — SIGNIFICANT CHANGE UP (ref 70–99)
GLUCOSE SERPL-MCNC: 124 MG/DL — HIGH (ref 70–99)
HCT VFR BLD CALC: 31.7 % — LOW (ref 39–50)
HGB BLD-MCNC: 10.7 G/DL — LOW (ref 13–17)
MAGNESIUM SERPL-MCNC: 1.7 MG/DL — SIGNIFICANT CHANGE UP (ref 1.6–2.6)
MCHC RBC-ENTMCNC: 30.8 PG — SIGNIFICANT CHANGE UP (ref 27–34)
MCHC RBC-ENTMCNC: 33.8 GM/DL — SIGNIFICANT CHANGE UP (ref 32–36)
MCV RBC AUTO: 91.4 FL — SIGNIFICANT CHANGE UP (ref 80–100)
NRBC # BLD: 0 /100 WBCS — SIGNIFICANT CHANGE UP (ref 0–0)
PHOSPHATE SERPL-MCNC: 2.9 MG/DL — SIGNIFICANT CHANGE UP (ref 2.5–4.5)
PLATELET # BLD AUTO: 158 K/UL — SIGNIFICANT CHANGE UP (ref 150–400)
POTASSIUM SERPL-MCNC: 4.1 MMOL/L — SIGNIFICANT CHANGE UP (ref 3.5–5.3)
POTASSIUM SERPL-SCNC: 4.1 MMOL/L — SIGNIFICANT CHANGE UP (ref 3.5–5.3)
RBC # BLD: 3.47 M/UL — LOW (ref 4.2–5.8)
RBC # FLD: 14.2 % — SIGNIFICANT CHANGE UP (ref 10.3–14.5)
SODIUM SERPL-SCNC: 139 MMOL/L — SIGNIFICANT CHANGE UP (ref 135–145)
WBC # BLD: 4.82 K/UL — SIGNIFICANT CHANGE UP (ref 3.8–10.5)
WBC # FLD AUTO: 4.82 K/UL — SIGNIFICANT CHANGE UP (ref 3.8–10.5)

## 2021-10-08 PROCEDURE — 99285 EMERGENCY DEPT VISIT HI MDM: CPT

## 2021-10-08 PROCEDURE — C1769: CPT

## 2021-10-08 PROCEDURE — 80053 COMPREHEN METABOLIC PANEL: CPT

## 2021-10-08 PROCEDURE — 96374 THER/PROPH/DIAG INJ IV PUSH: CPT

## 2021-10-08 PROCEDURE — 83690 ASSAY OF LIPASE: CPT

## 2021-10-08 PROCEDURE — C1889: CPT

## 2021-10-08 PROCEDURE — 76857 US EXAM PELVIC LIMITED: CPT

## 2021-10-08 PROCEDURE — 85027 COMPLETE CBC AUTOMATED: CPT

## 2021-10-08 PROCEDURE — 86901 BLOOD TYPING SEROLOGIC RH(D): CPT

## 2021-10-08 PROCEDURE — 82248 BILIRUBIN DIRECT: CPT

## 2021-10-08 PROCEDURE — 87340 HEPATITIS B SURFACE AG IA: CPT

## 2021-10-08 PROCEDURE — 84100 ASSAY OF PHOSPHORUS: CPT

## 2021-10-08 PROCEDURE — 85730 THROMBOPLASTIN TIME PARTIAL: CPT

## 2021-10-08 PROCEDURE — 87086 URINE CULTURE/COLONY COUNT: CPT

## 2021-10-08 PROCEDURE — 85610 PROTHROMBIN TIME: CPT

## 2021-10-08 PROCEDURE — 74019 RADEX ABDOMEN 2 VIEWS: CPT

## 2021-10-08 PROCEDURE — 83036 HEMOGLOBIN GLYCOSYLATED A1C: CPT

## 2021-10-08 PROCEDURE — 86803 HEPATITIS C AB TEST: CPT

## 2021-10-08 PROCEDURE — 80048 BASIC METABOLIC PNL TOTAL CA: CPT

## 2021-10-08 PROCEDURE — 82247 BILIRUBIN TOTAL: CPT

## 2021-10-08 PROCEDURE — 83605 ASSAY OF LACTIC ACID: CPT

## 2021-10-08 PROCEDURE — 74177 CT ABD & PELVIS W/CONTRAST: CPT | Mod: MA

## 2021-10-08 PROCEDURE — 86850 RBC ANTIBODY SCREEN: CPT

## 2021-10-08 PROCEDURE — 76000 FLUOROSCOPY <1 HR PHYS/QHP: CPT

## 2021-10-08 PROCEDURE — 36415 COLL VENOUS BLD VENIPUNCTURE: CPT

## 2021-10-08 PROCEDURE — 74181 MRI ABDOMEN W/O CONTRAST: CPT

## 2021-10-08 PROCEDURE — 80076 HEPATIC FUNCTION PANEL: CPT

## 2021-10-08 PROCEDURE — 82962 GLUCOSE BLOOD TEST: CPT

## 2021-10-08 PROCEDURE — 81001 URINALYSIS AUTO W/SCOPE: CPT

## 2021-10-08 PROCEDURE — 86769 SARS-COV-2 COVID-19 ANTIBODY: CPT

## 2021-10-08 PROCEDURE — 80197 ASSAY OF TACROLIMUS: CPT

## 2021-10-08 PROCEDURE — 87635 SARS-COV-2 COVID-19 AMP PRB: CPT

## 2021-10-08 PROCEDURE — 76700 US EXAM ABDOM COMPLETE: CPT

## 2021-10-08 PROCEDURE — 86900 BLOOD TYPING SEROLOGIC ABO: CPT

## 2021-10-08 PROCEDURE — 82977 ASSAY OF GGT: CPT

## 2021-10-08 PROCEDURE — 83735 ASSAY OF MAGNESIUM: CPT

## 2021-10-08 RX ORDER — ENOXAPARIN SODIUM 100 MG/ML
40 INJECTION SUBCUTANEOUS DAILY
Refills: 0 | Status: DISCONTINUED | OUTPATIENT
Start: 2021-10-08 | End: 2021-10-08

## 2021-10-08 RX ORDER — TACROLIMUS 5 MG/1
1 CAPSULE ORAL
Qty: 0 | Refills: 0 | DISCHARGE

## 2021-10-08 RX ORDER — METRONIDAZOLE 500 MG
1 TABLET ORAL
Qty: 9 | Refills: 0
Start: 2021-10-08 | End: 2021-10-10

## 2021-10-08 RX ORDER — CEFUROXIME AXETIL 250 MG
1 TABLET ORAL
Qty: 6 | Refills: 0
Start: 2021-10-08 | End: 2021-10-10

## 2021-10-08 RX ADMIN — HEPARIN SODIUM 5000 UNIT(S): 5000 INJECTION INTRAVENOUS; SUBCUTANEOUS at 05:26

## 2021-10-08 RX ADMIN — TAMSULOSIN HYDROCHLORIDE 0.4 MILLIGRAM(S): 0.4 CAPSULE ORAL at 21:53

## 2021-10-08 RX ADMIN — PIPERACILLIN AND TAZOBACTAM 25 GRAM(S): 4; .5 INJECTION, POWDER, LYOPHILIZED, FOR SOLUTION INTRAVENOUS at 13:52

## 2021-10-08 RX ADMIN — ENOXAPARIN SODIUM 40 MILLIGRAM(S): 100 INJECTION SUBCUTANEOUS at 13:52

## 2021-10-08 RX ADMIN — TACROLIMUS 1 MILLIGRAM(S): 5 CAPSULE ORAL at 18:33

## 2021-10-08 RX ADMIN — TACROLIMUS 1 MILLIGRAM(S): 5 CAPSULE ORAL at 13:50

## 2021-10-08 RX ADMIN — PIPERACILLIN AND TAZOBACTAM 25 GRAM(S): 4; .5 INJECTION, POWDER, LYOPHILIZED, FOR SOLUTION INTRAVENOUS at 05:26

## 2021-10-08 RX ADMIN — MYCOPHENOLATE MOFETIL 750 MILLIGRAM(S): 250 CAPSULE ORAL at 18:33

## 2021-10-08 RX ADMIN — Medication 81 MILLIGRAM(S): at 13:50

## 2021-10-08 RX ADMIN — MYCOPHENOLATE MOFETIL 750 MILLIGRAM(S): 250 CAPSULE ORAL at 05:26

## 2021-10-08 NOTE — PROGRESS NOTE ADULT - REASON FOR ADMISSION
CHOLELITHIASIS

## 2021-10-08 NOTE — PROGRESS NOTE ADULT - ASSESSMENT
Patient is a 80y Male whom presented to the hospital with epigastric pain.  He has H/O HTN, DM2 and ESRD was on HD. s/p DDRTXP at [facility] in 2016. Follows with Dr Contreras, who is spoke with.  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  SCR at baseline. denies hematuria     # s/p renal transplant. SCR at baseline. 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 I spoke with his transplant surgeon at [facility] ( Dr Keren Singh)  She will review his prior imaging and call me in the morning.  Urology was consulted  # immunosuppression regimen: cellcept 750mg two times per day, tacrolimus 1.5mg AM 1mg pm  check trough  level.   # BPH cont flomax  
seen and examined vsstable afebrile physical done  no cp or sob or palp  abd pain better    s/p ercp  doing ok  having full meal watch for   PT   labs noted   lft better  d/c planning  f/u pcp, GI, surgery
seen and examined vsstable afebrile physical done  not in any distress  no cp or sob  abd pain better, no nausea or vomiting  abd soft mild tender in upper abd   labs noted   MRCP cbd stones  choledocolithiasis  a/p d/w GI going for MRCP in am   zosyn 
Patient is a 80y Male whom presented to the hospital with epigastric pain.  He has H/O HTN, DM2 and ESRD was on HD. s/p DDRTXP at [facility] in 2016. Follows with Dr Contreras, who is spoke with.  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  SCR at baseline. denies hematuria     # s/p renal transplant. SCR at baseline.   # Transplant hydronephrosis- d/w his transplant surgeon- hydronephrosis is chronic  # immunosuppression regimen: cellcept 750mg two times per day, tacrolimus 1.5mg AM 1mg pm  check trough  level 8.9  decrease tacrolimus to 1mg two times per day   # BPH cont flomax  
cbd stones     Advance diet   dc fluids  discharge planning 
Patient is a 80y Male whom presented to the hospital with epigastric pain.  He has H/O HTN, DM2 and ESRD was on HD. s/p DDRTXP at [facility] in 2016. Follows with Dr Contreras, who is spoke with.  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  SCR at baseline. denies hematuria     # s/p renal transplant. SCR at baseline.   # Transplant hydronephrosis- d/w his transplant surgeon- hydronephrosis is chronic  # immunosuppression regimen: cellcept 750mg two times per day and  tacrolimus to 1mg two times per day   # choledocholithiasis ERCP per gastroenterology  # BPH cont flomax  
came to see pt  is in ercp suite  chart reviewed , vs stable  labs noted  \  d/w staff  will follow
80 year old male has past medical hx of CKD5 s/p kidney transplant on 2016, hypertension, diabetes came to ED c/o epigastric pain admitted for cholelithiasis.   
80 year old male has past medical hx of CKD5 s/p kidney transplant on 2016, hypertension, diabetes came to ED c/o epigastric pain admitted for cholelithiasis.

## 2021-10-08 NOTE — PROGRESS NOTE ADULT - SUBJECTIVE AND OBJECTIVE BOX
Summary:   80y  Male      Subjective:     doing well  Objective:    MEDICATIONS  (STANDING):  aspirin enteric coated 81 milliGRAM(s) Oral daily  enoxaparin Injectable 40 milliGRAM(s) SubCutaneous daily  influenza   Vaccine 0.5 milliLiter(s) IntraMuscular once  insulin lispro (ADMELOG) corrective regimen sliding scale   SubCutaneous three times a day before meals  insulin lispro (ADMELOG) corrective regimen sliding scale   SubCutaneous at bedtime  lactated ringers. 1000 milliLiter(s) (999 mL/Hr) IV Continuous <Continuous>  lactated ringers. 1000 milliLiter(s) (175 mL/Hr) IV Continuous <Continuous>  mycophenolate mofetil 750 milliGRAM(s) Oral two times a day  piperacillin/tazobactam IVPB.. 3.375 Gram(s) IV Intermittent every 8 hours  tacrolimus 1 milliGRAM(s) Oral daily  tacrolimus 1 milliGRAM(s) Oral <User Schedule>  tamsulosin 0.4 milliGRAM(s) Oral at bedtime    MEDICATIONS  (PRN):  acetaminophen   Tablet .. 650 milliGRAM(s) Oral every 6 hours PRN Temp greater or equal to 38C (100.4F), Mild Pain (1 - 3)  ondansetron Injectable 4 milliGRAM(s) IV Push every 8 hours PRN Nausea and/or Vomiting              Vital Signs Last 24 Hrs  T(C): 36.6 (08 Oct 2021 14:14), Max: 36.7 (07 Oct 2021 20:29)  T(F): 97.8 (08 Oct 2021 14:14), Max: 98.1 (07 Oct 2021 20:29)  HR: 74 (08 Oct 2021 14:14) (69 - 74)  BP: 128/77 (08 Oct 2021 14:14) (122/71 - 136/72)  BP(mean): 88 (07 Oct 2021 20:29) (88 - 88)  RR: 16 (08 Oct 2021 14:14) (16 - 18)  SpO2: 98% (08 Oct 2021 14:14) (98% - 99%)      General:  Well developed, well nourished, alert and active, no pallor, NAD.  HEENT:    Normal appearance of conjunctiva, ears, nose, lips, oropharynx, and oral mucosa, anicteric.  Neck:  No masses, no asymmetry.  Lymph Nodes:  No lymphadenopathy.   Cardiovascular:  RRR normal S1/S2, no murmur.  Respiratory:  CTA B/L, normal respiratory effort.   Abdominal:   soft, no masses or tenderness, normoactive BS, NT/ND, no HSM.  Extremities:   No clubbing or cyanosis, normal capillary refill, no edema.   Skin:   No rash, jaundice, lesions, eczema.   Musculoskeletal:  No joint swelling, erythema or tenderness.   Neuro: No focal deficits.   Other:       LABS:                        10.7   4.82  )-----------( 158      ( 08 Oct 2021 08:38 )             31.7     10-08    139  |  107  |  13  ----------------------------<  124<H>  4.1   |  24  |  1.06    Ca    8.7      08 Oct 2021 07:25  Phos  2.9     10-08  Mg     1.7     10-08    TPro  6.0  /  Alb  2.9<L>  /  TBili  3.2<H>  /  DBili  1.7<H>  /  AST  45<H>  /  ALT  84<H>  /  AlkPhos  177<H>  10-08          RADIOLOGY & ADDITIONAL TESTS:       ERCP  Report  Indication:   Instrument:  #  Anesthesia: General in OR   Consent:  Informed consent was obtained from the patient after providing any opportunity for questions  Procedure: See below for full summary    Preparation: NPO, Ancef, Indomethacin   Findings:   ERCP was performed:  1)	The duodenoscope was gently passed down the esophagus, into the stomach and into the duodenum.   2)	The ampulla was identified   3)	The wire preferentially went into the PD   4)	A PD wire was left in place   5)	Under fluoroscopy the CBD was successfully cannulated.   6)	The PD wire was removed.   7)	Cholangiogram showed a slightly dilated CBD (9mm) , with numerous filling defects   8)	A sphincterotomy was performed  9)	Multiple balloon sweeps performed with copious amounts of sludge and stones removed.   10)	Occlusion cholangiogram showed no filling defects   ____________________________________________  Date and time:1/8/2020 12:15:40 PM  EBL:0  Impression: 1-ERC s.p sphincterotomy and stones extraction     Plan: 1- NPO until tonight then clear liquids 2- IVF Bolus X 3 followed by 200 cc/ hour X 8 hours 3-Avoid NSAIDs X 10 days 4- Continue antibiotics 5- Cholecystectomy as per surgical team               Procedure Start Time:    Procedure End Time:                              Attending:         Darryn Davalos M.D.  Date and Time: 1/8/2020 12:15:40 PM

## 2021-10-08 NOTE — DISCHARGE NOTE PROVIDER - NSDCMRMEDTOKEN_GEN_ALL_CORE_FT
aspirin 81 mg oral delayed release tablet: 1 tab(s) orally once a day  metFORMIN 500 mg oral tablet: 1 tab(s) orally 2 times a day  mycophenolate mofetil 250 mg oral capsule: 1 cap(s) orally 2 times a day  mycophenolate mofetil 500 mg oral tablet: 1 tab(s) orally 2 times a day  tacrolimus 1 mg oral capsule: 1 cap(s) orally every 12 hours  tamsulosin 0.4 mg oral capsule: 1 cap(s) orally once a day   aspirin 81 mg oral delayed release tablet: 1 tab(s) orally once a day  cefuroxime 250 mg oral tablet: 1 tab(s) orally 2 times a day   Flagyl 500 mg oral tablet: 1 tab(s) orally every 8 hours   metFORMIN 500 mg oral tablet: 1 tab(s) orally 2 times a day  mycophenolate mofetil 250 mg oral capsule: 1 cap(s) orally 2 times a day  mycophenolate mofetil 500 mg oral tablet: 1 tab(s) orally 2 times a day  tacrolimus 1 mg oral capsule: 1 cap(s) orally every 12 hours  tamsulosin 0.4 mg oral capsule: 1 cap(s) orally once a day

## 2021-10-08 NOTE — PROGRESS NOTE ADULT - SUBJECTIVE AND OBJECTIVE BOX
HPI:  80 year old male, from home, ambulates independently, has past medical hx of CKD5 s/p kidney transplant on 2016, hypertension, diabetes came to ED c/o epigastric pain that started last night after eating crab meat. Patient refers pain was constant and severe 10/10, non radiating, associated to nausea, no vomiting or diarrhea. Patient denies any similar episodes in the past. Patient denies chest pain, sob, dizziness, palpitations, diarrhea, bleeding, fever, chills or sick contacts.    [facility] full code  (05 Oct 2021 15:11)      Patient is a 80y old  Male who presents with a chief complaint of Abdominal Pain, nausea  (08 Oct 2021 13:41)      INTERVAL HPI/OVERNIGHT EVENTS:  T(C): 36.6 (10-08-21 @ 14:14), Max: 36.7 (10-07-21 @ 20:29)  HR: 74 (10-08-21 @ 14:14) (69 - 74)  BP: 128/77 (10-08-21 @ 14:14) (122/71 - 136/72)  RR: 16 (10-08-21 @ 14:14) (16 - 18)  SpO2: 98% (10-08-21 @ 14:14) (98% - 99%)  Wt(kg): --  I&O's Summary      REVIEW OF SYSTEMS: denies fever, chills, SOB, palpitations, chest pain, abdominal pain, nausea, vomitting, diarrhea, constipation, dizziness    MEDICATIONS  (STANDING):  aspirin enteric coated 81 milliGRAM(s) Oral daily  enoxaparin Injectable 40 milliGRAM(s) SubCutaneous daily  influenza   Vaccine 0.5 milliLiter(s) IntraMuscular once  insulin lispro (ADMELOG) corrective regimen sliding scale   SubCutaneous three times a day before meals  insulin lispro (ADMELOG) corrective regimen sliding scale   SubCutaneous at bedtime  lactated ringers. 1000 milliLiter(s) (999 mL/Hr) IV Continuous <Continuous>  lactated ringers. 1000 milliLiter(s) (175 mL/Hr) IV Continuous <Continuous>  mycophenolate mofetil 750 milliGRAM(s) Oral two times a day  piperacillin/tazobactam IVPB.. 3.375 Gram(s) IV Intermittent every 8 hours  tacrolimus 1 milliGRAM(s) Oral daily  tacrolimus 1 milliGRAM(s) Oral <User Schedule>  tamsulosin 0.4 milliGRAM(s) Oral at bedtime    MEDICATIONS  (PRN):  acetaminophen   Tablet .. 650 milliGRAM(s) Oral every 6 hours PRN Temp greater or equal to 38C (100.4F), Mild Pain (1 - 3)  ondansetron Injectable 4 milliGRAM(s) IV Push every 8 hours PRN Nausea and/or Vomiting      PHYSICAL EXAM:  GENERAL: NAD, well-groomed, well-developed  HEAD:  Atraumatic, Normocephalic  EYES: EOMI, PERRLA, conjunctiva and sclera clear  ENMT: No tonsillar erythema, exudates, or enlargement; Moist mucous membranes, Good dentition, No lesions  NECK: Supple, No JVD, Normal thyroid  NERVOUS SYSTEM:  Alert & Oriented X3, Good concentration; Motor Strength 5/5 B/L upper and lower extremities; DTRs 2+ intact and symmetric  CHEST/LUNG: Clear to percussion bilaterally; No rales, rhonchi, wheezing, or rubs  HEART: Regular rate and rhythm; No murmurs, rubs, or gallops  ABDOMEN: Soft, Nontender, Nondistended; Bowel sounds present  EXTREMITIES:  2+ Peripheral Pulses, No clubbing, cyanosis, or edema  LYMPH: No lymphadenopathy noted  SKIN: No rashes or lesions  LABS:                        10.7   4.82  )-----------( 158      ( 08 Oct 2021 08:38 )             31.7     10-08    139  |  107  |  13  ----------------------------<  124<H>  4.1   |  24  |  1.06    Ca    8.7      08 Oct 2021 07:25  Phos  2.9     10-08  Mg     1.7     10-08    TPro  6.0  /  Alb  2.9<L>  /  TBili  3.2<H>  /  DBili  1.7<H>  /  AST  45<H>  /  ALT  84<H>  /  AlkPhos  177<H>  10-08        CAPILLARY BLOOD GLUCOSE      POCT Blood Glucose.: 115 mg/dL (08 Oct 2021 11:21)  POCT Blood Glucose.: 109 mg/dL (08 Oct 2021 07:28)  POCT Blood Glucose.: 97 mg/dL (07 Oct 2021 21:05)  POCT Blood Glucose.: 111 mg/dL (07 Oct 2021 16:35)

## 2021-10-08 NOTE — DISCHARGE NOTE PROVIDER - CARE PROVIDER_API CALL
Keren Singh  [address] [address]  Phone: [phone]  Fax: (   )    -  Established Patient  Follow Up Time: Routine    Darryn Davalos  Gastroenterology  [address] [place] [address]  Phone: [phone]  Fax: [phone]  Follow Up Time: 1 week    Yeyo Tsang)  Surgery  [address] [place] [address]  Phone: [phone]  Fax: [phone]  Follow Up Time: Routine

## 2021-10-08 NOTE — DISCHARGE NOTE NURSING/CASE MANAGEMENT/SOCIAL WORK - PATIENT PORTAL LINK FT
You can access the FollowMyHealth Patient Portal offered by [facility] by registering at the following website: [email or web address]. By joining [facility]’s FollowMyHealth portal, you will also be able to view your health information using other applications (apps) compatible with our system.

## 2021-10-08 NOTE — DISCHARGE NOTE PROVIDER - PROVIDER TOKENS
FREE:[LAST:[Francisco],FIRST:[Keren],PHONE:[phone]],FAX:[(   [phone]    -],ADDRESS:[ [address] [place] [address]],FOLLOWUP:[Routine],ESTABLISHEDPATIENT:[T]],PROVIDER:[TOKEN:[[id number]],FOLLOWUP:[1 week]],PROVIDER:[TOKEN:[[id number]],FOLLOWUP:[Routine]]

## 2021-10-08 NOTE — DISCHARGE NOTE PROVIDER - CARE PROVIDERS DIRECT ADDRESSES
,DirectAddress_Unknown,[email or web address]

## 2021-10-08 NOTE — DISCHARGE NOTE PROVIDER - HOSPITAL COURSE
Patient is a 81 yo M w Pmhx of CKD5 s/p kidney transplant 2016, HTN, diabetes, who came to the ED c/o epigastric pain that started last night after eating crab meat. Patient refers pain was constant and severe 10/10, non radiating, associated to nausea, no vomiting or diarrhea. Patient denies any similar episodes in the past. Patient denies chest pain, sob, dizziness, palpitations, diarrhea, bleeding, fever, chills or sick. Upon arrival to the ED, patient had normal vitals. Patient found to have leukocytosis, started on zosyn.  CXR, US abdomen, and CT abdomen and pelvis were ordered. US abdomen revealed Small echogenic kidneys consistent with medical renal disease. Bilateral renal cysts, echogenic liver suggesting fatty infiltration, right lower quadrant transplant kidney demonstrated hydronephrosis, and cholelithiasis was found. CBD measured at 7mm.  Subsequently, US of urinary bladder was normal except for an enlarged prostate.   CT abdomen and pelvis revealed dilated common duct (1.1cm) in caliber, new. Recommended follow up MRCP for further evaluation. Mild hydronephrosis of right transplant kidney likely due to compression of proximal transplant ureter between external iliac artery and the renal transplant. CBC revealed transaminitis likely 2/2 to cholelithiasis. Ordered hep studies, GGT, bilirubin. GI consulted, Dr. Ramirez. Nephro, Dr. Mccartney   consulted for patients renal transplant history. Patient home regimen of Cellcept 2x a day and tacro 1.5mg AM , 1mg in PM. Tacrolimus levels routinely checked during hospital stay. Tacro level slightly elevated, 8.9, regimen changed by nephro to 1mg BID. Repeat level normal, 8.0.  Dr. Ramirez agreed with plan for MRCP. MRCP revealed cholelithiases and extensive choledocholithasis (numerous calculi measuring up to 5mm within the cystic and common bile ducts). CBD dilatation 1.1cm. Plan for ERCP for stone removal following day. Patient GGT elevated consistent w ductal pathology, elevated bilirubin, negative hepatitis panel.  ID, Dr. Swartz, consulted regarding AB management, continued zosyn. Patient had ERCP done successfully, slight leakage of fluid into pancreatic duct. Monitored post-op for signs of pancreatitis, patient was clinically stable and asymptomatic. Tolerated clear liquid diet well, advanced diet and also tolerated it well. Patient's transaminitis trended down throughout stay, resolved????_____  Patient leukocytosis resolved during hospital stay, discharging on ____AB_.  Patient clinically stable for discharge, to follow up with GI and Surgery.         Patient is a 81 yo M w Pmhx of CKD5 s/p kidney transplant 2016, HTN, diabetes, who came to the ED c/o epigastric pain that started last night after eating crab meat. Patient refers pain was constant and severe 10/10, non radiating, associated to nausea, no vomiting or diarrhea. Patient denies any similar episodes in the past. Patient denies chest pain, sob, dizziness, palpitations, diarrhea, bleeding, fever, chills or sick. Upon arrival to the ED, patient had normal vitals. Patient found to have leukocytosis, started on zosyn.  CXR, US abdomen, and CT abdomen and pelvis were ordered. US abdomen revealed Small echogenic kidneys consistent with medical renal disease. Bilateral renal cysts, echogenic liver suggesting fatty infiltration, right lower quadrant transplant kidney demonstrated hydronephrosis, and cholelithiasis was found. CBD measured at 7mm.  Subsequently, US of urinary bladder was normal except for an enlarged prostate.   CT abdomen and pelvis revealed dilated common duct (1.1cm) in caliber, new. Recommended follow up MRCP for further evaluation. Mild hydronephrosis of right transplant kidney likely due to compression of proximal transplant ureter between external iliac artery and the renal transplant. CBC revealed transaminitis likely 2/2 to cholelithiasis. Ordered hep studies, GGT, bilirubin. GI consulted, Dr. Ramirez. Nephro, Dr. Mccartney   consulted for patients renal transplant history. Patient home regimen of Cellcept 2x a day and tacro 1.5mg AM , 1mg in PM. Tacrolimus levels routinely checked during hospital stay. Tacro level slightly elevated, 8.9, regimen changed by nephro to 1mg BID. Repeat level normal, 8.0.  Dr. Ramirez agreed with plan for MRCP. MRCP revealed cholelithiases and extensive choledocholithasis (numerous calculi measuring up to 5mm within the cystic and common bile ducts). CBD dilatation 1.1cm. Plan for ERCP for stone removal following day. Patient GGT elevated consistent w ductal pathology, elevated bilirubin, negative hepatitis panel.  ID, Dr. Swartz, consulted regarding AB management, continued zosyn. Patient had ERCP done successfully, slight leakage of fluid into pancreatic duct. Monitored post-op for signs of pancreatitis, patient was clinically stable and asymptomatic. Tolerated clear liquid diet well, advanced diet and also tolerated it well. Patient's transaminitis trended down throughout stay, resolved.   Patient leukocytosis resolved during hospital stay, discharging on Ceftin and Flagyl for three more days.   Patient clinically stable for discharge, to follow up with GI and Surgery.

## 2021-10-08 NOTE — DISCHARGE NOTE PROVIDER - NSDCCPCAREPLAN_GEN_ALL_CORE_FT
PRINCIPAL DISCHARGE DIAGNOSIS  Diagnosis: Symptomatic cholelithiasis  Assessment and Plan of Treatment: You presented with abdominal pain, nausea, and vomiting. By CT Scan imaging, you had features of common bile duct dilation which was further investigated with an MRCP. The MRCP was showed multiple stones which were then removed using ERCP. You tolerated the procedure well and tolerated a normal diet. As you were admitted with an elevated WBC count, you were started on IV Zosyn empirically for cholecytistis although the clinical findings were low. You completed 3 days of the same, and ID was consulted: they recommeneded _______. Please follow up with Dr. Susannah CHÁVEZ, and Dr. Pryor Surgery.      SECONDARY DISCHARGE DIAGNOSES  Diagnosis: Diabetes mellitus  Assessment and Plan of Treatment: You have Type 2 diabetes and were found to have a Hemoglobin A1c of 6.4% which shows a pretty well controlled glucose levels in your body over the past 3 months. However, a normal HbA1c is 5.5%. You are advised to eat foods that are low glycemic index which include beans and complex carbs, and to avoid high GI foods including white rice and potatoes. Uncontrolled sugars can lead to blindness, kidney failure, and contribute to diseases such as heart attacks and strokes. Your were not started on any medications at this hospital visit, and you are advised to keep your Primary Care Physician appointments in order to monitor and change medications as necessary.      Diagnosis: History of renal transplantation  Assessment and Plan of Treatment: You have a history of renal transplantation and are on Tacrolimus and MMF. You are compliant with your medications and levels taken in the hospital are within limits. You were evaluated by Nephrology for mild hydronephrosis seen in the transplanted kidney and it was confirmed with your outpatient team that this was a chronic issue. Your TACROLIMUS dose was adjusted to 1mg TWICE DAILY. Please follow up with your outpatient nephrologist.     PRINCIPAL DISCHARGE DIAGNOSIS  Diagnosis: Symptomatic cholelithiasis  Assessment and Plan of Treatment: You presented with abdominal pain, nausea, and vomiting. By CT Scan imaging, you had features of common bile duct dilation which was further investigated with an MRCP. The MRCP was showed multiple stones which were then removed using ERCP. You tolerated the procedure well and tolerated a normal diet. As you were admitted with an elevated WBC count, you were started on IV Zosyn empirically for cholecytistis although the clinical findings were low. You completed 3 days of the same, and ID was consulted: they recommended to continue on CEFTIN 250mg BID, and FLAGYL 500 TID x 3 more days to cover for possible infection . Please follow up with Dr. Davalos GI, and Dr. Pryor Surgery for further management.      SECONDARY DISCHARGE DIAGNOSES  Diagnosis: Diabetes mellitus  Assessment and Plan of Treatment: You have Type 2 diabetes and were found to have a Hemoglobin A1c of 6.4% which shows a pretty well controlled glucose levels in your body over the past 3 months. However, a normal HbA1c is 5.5%. You are advised to eat foods that are low glycemic index which include beans and complex carbs, and to avoid high GI foods including white rice and potatoes. Uncontrolled sugars can lead to blindness, kidney failure, and contribute to diseases such as heart attacks and strokes. Your were not started on any medications at this hospital visit, and you are advised to keep your Primary Care Physician appointments in order to monitor and change medications as necessary.      Diagnosis: History of renal transplantation  Assessment and Plan of Treatment: You have a history of renal transplantation and are on Tacrolimus and MMF. You are compliant with your medications and levels taken in the hospital are within limits. You were evaluated by Nephrology for mild hydronephrosis seen in the transplanted kidney and it was confirmed with your outpatient team that this was a chronic issue. Your TACROLIMUS dose was adjusted to 1mg TWICE DAILY. Please follow up with your outpatient nephrologist.    Diagnosis: Discharge planning issues  Assessment and Plan of Treatment: You were evaluated by nursing staff and found to be ambulating at your baseline, without any assistance or skilled needs. You are otherwise able to go home without any issues to the care of your family.

## 2021-10-08 NOTE — DISCHARGE NOTE NURSING/CASE MANAGEMENT/SOCIAL WORK - NSDCVIVACCINE_GEN_ALL_CORE_FT
rabies, intradermal injection; 04-Sep-2020 17:59; Dona Boyd (RN); [employer]; [id number] (Exp. Date: 05-Jan-2023); IntraMuscular; Deltoid Right.; 1 milliLiter(s); VIS (VIS Published: 04-Oct-2021, VIS Presented: 04-Sep-2020);   rabies, intradermal injection; 07-Sep-2020 10:42; Jeanine Tapia (RN); [employer]; [id number] (Exp. Date: 05-May-2023); IntraMuscular; Deltoid Right.; 1 milliLiter(s); VIS (VIS Published: 08-Aug-2018, VIS Presented: 07-Sep-2020);   rabies, intradermal injection; 11-Sep-2020 10:39; Rachel Teague (RN); [employer]; [id number] (Exp. Date: 01-May-2021); IntraMuscular; Deltoid Right.; 1 milliLiter(s); VIS (VIS Published: 11-Sep-2020, VIS Presented: 11-Sep-2020);   rabies, intradermal injection; 18-Sep-2020 10:47; Cara Aguilar (RN); [employer]; [id number] (Exp. Date: 01-Jan-2024); IntraMuscular; Deltoid Right.; 1 milliLiter(s); VIS (VIS Published: 16-Jul-2020, VIS Presented: 18-Sep-2020);   Tdap; 20-Oct-2019 14:22; Lori Carlos (RN); [employer]; [id number] (Exp. Date: 22-Oct-2022); IntraMuscular; Deltoid Right.; 0.5 milliLiter(s); VIS (VIS Published: 09-May-2013, VIS Presented: 20-Oct-2019);

## 2021-10-08 NOTE — PROGRESS NOTE ADULT - PROVIDER SPECIALTY LIST ADULT
Internal Medicine
Nephrology
Gastroenterology
Internal Medicine
Internal Medicine
Nephrology
Internal Medicine
Nephrology
Internal Medicine
Internal Medicine

## 2021-10-12 PROBLEM — I10 ESSENTIAL (PRIMARY) HYPERTENSION: Chronic | Status: ACTIVE | Noted: 2021-10-05

## 2021-10-28 ENCOUNTER — APPOINTMENT (OUTPATIENT)
Dept: SURGERY | Facility: CLINIC | Age: 81
End: 2021-10-28
Payer: MEDICAID

## 2021-10-28 VITALS
HEIGHT: 61 IN | SYSTOLIC BLOOD PRESSURE: 119 MMHG | OXYGEN SATURATION: 99 % | WEIGHT: 150 LBS | DIASTOLIC BLOOD PRESSURE: 71 MMHG | BODY MASS INDEX: 28.32 KG/M2 | HEART RATE: 78 BPM

## 2021-10-28 DIAGNOSIS — Z78.9 OTHER SPECIFIED HEALTH STATUS: ICD-10-CM

## 2021-10-28 DIAGNOSIS — K80.50 CALCULUS OF BILE DUCT W/OUT CHOLANGITIS OR CHOLECYSTITIS W/OUT OBSTRUCTION: ICD-10-CM

## 2021-10-28 DIAGNOSIS — Z87.448 PERSONAL HISTORY OF OTHER DISEASES OF URINARY SYSTEM: ICD-10-CM

## 2021-10-28 DIAGNOSIS — Z86.39 PERSONAL HISTORY OF OTHER ENDOCRINE, NUTRITIONAL AND METABOLIC DISEASE: ICD-10-CM

## 2021-10-28 DIAGNOSIS — Z86.79 PERSONAL HISTORY OF OTHER DISEASES OF THE CIRCULATORY SYSTEM: ICD-10-CM

## 2021-10-28 PROCEDURE — 99204 OFFICE O/P NEW MOD 45 MIN: CPT

## 2021-10-28 RX ORDER — TAMSULOSIN HYDROCHLORIDE 0.4 MG/1
CAPSULE ORAL
Refills: 0 | Status: ACTIVE | COMMUNITY

## 2021-10-28 RX ORDER — PENICILLIN G PROCAINE 600000/ML
SYRINGE (ML) INTRAMUSCULAR
Refills: 0 | Status: ACTIVE | COMMUNITY

## 2021-10-28 RX ORDER — METFORMIN ER 500 MG 500 MG/1
500 TABLET ORAL
Refills: 0 | Status: ACTIVE | COMMUNITY

## 2021-10-28 RX ORDER — LOSARTAN POTASSIUM 100 MG/1
TABLET, FILM COATED ORAL
Refills: 0 | Status: ACTIVE | COMMUNITY

## 2021-10-28 NOTE — DATA REVIEWED
[FreeTextEntry1] : EXAM: MR MRCP\par \par \par PROCEDURE DATE: 10/06/2021\par \par \par \par INTERPRETATION: CLINICAL INFORMATION: Abdominal pain\par \par COMPARISON: CT and ultrasound dated 10/5/2021\par \par CONTRAST/COMPLICATIONS:\par IV Contrast: NONE\par Oral Contrast: NONE\par Complications: None reported at time of study completion\par \par PROCEDURE:\par MRI of the abdomen was performed.\par MRCP was performed.\par \par FINDINGS:\par LOWER CHEST: Within normal limits.\par \par LIVER: Within normal limits.\par BILE DUCTS: Numerous calculi within the cystic and common bile ducts, largest measuring up to 5 mm. CBD dilatation up to 11 mm.\par GALLBLADDER: Cholelithiasis.\par SPLEEN: Indeterminate T2 hyperintensities measuring 0.9 cm and 1.2 cm.\par PANCREAS: Within normal limits.\par ADRENALS: Within normal limits.\par KIDNEYS/URETERS: Bilateral renal cysts, largest in the native right kidney measures 2.6 cm. 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 \par --- End of Report --- \par \par JACQUELINE LOAIZA MD; Attending Radiologist\par This document has been electronically signed. Oct 6 2021 1:50PM

## 2021-10-28 NOTE — HISTORY OF PRESENT ILLNESS
[de-identified] : Mr. CARLOS GODINEZ is a 80 year  old patient who was referred by Dr. Singh Sullivan with the chief complaint of having right upper quadrant and epigastric pain     He reports no nausea or vomiting and no history of jaundice, acholia or choluria.   Appetite is good and weight is stable.   He   has  family history of biliary tract disease in his daughter.    Mr. GODINEZ  was in [facility]  ED on  10/05/2021 with abdominal pain, nausea,  and vomiting.  CT Scan showed  common bile duct dilation  which was further investigated with an MRCP. The MRCP  showed multiple  stones which were then removed using ERCP.

## 2021-10-28 NOTE — PHYSICAL EXAM
[Alert] : alert [Oriented to Person] : oriented to person [Oriented to Place] : oriented to place [Oriented to Time] : oriented to time [Calm] : calm [de-identified] : He  is alert, well-groomed, and in NAD\par   [de-identified] : anicteric.  Nasal mucosa pink, septum midline. Oral mucosa pink.  Tongue midline, Pharynx without exudates.\par   [de-identified] : Neck supple. Trachea midline. Thyroid isthmus barely palpable, lobes not felt.\par

## 2021-11-10 ENCOUNTER — OUTPATIENT (OUTPATIENT)
Dept: OUTPATIENT SERVICES | Facility: HOSPITAL | Age: 81
LOS: 1 days | End: 2021-11-10
Payer: MEDICAID

## 2021-11-10 VITALS
SYSTOLIC BLOOD PRESSURE: 129 MMHG | DIASTOLIC BLOOD PRESSURE: 73 MMHG | OXYGEN SATURATION: 100 % | WEIGHT: 145.95 LBS | TEMPERATURE: 96 F | HEART RATE: 74 BPM | RESPIRATION RATE: 18 BRPM | HEIGHT: 61 IN

## 2021-11-10 DIAGNOSIS — K81.9 CHOLECYSTITIS, UNSPECIFIED: ICD-10-CM

## 2021-11-10 DIAGNOSIS — Z91.89 OTHER SPECIFIED PERSONAL RISK FACTORS, NOT ELSEWHERE CLASSIFIED: ICD-10-CM

## 2021-11-10 DIAGNOSIS — E11.9 TYPE 2 DIABETES MELLITUS WITHOUT COMPLICATIONS: ICD-10-CM

## 2021-11-10 DIAGNOSIS — Z79.82 LONG TERM (CURRENT) USE OF ASPIRIN: ICD-10-CM

## 2021-11-10 DIAGNOSIS — Z01.818 ENCOUNTER FOR OTHER PREPROCEDURAL EXAMINATION: ICD-10-CM

## 2021-11-10 DIAGNOSIS — I10 ESSENTIAL (PRIMARY) HYPERTENSION: ICD-10-CM

## 2021-11-10 DIAGNOSIS — K80.50 CALCULUS OF BILE DUCT WITHOUT CHOLANGITIS OR CHOLECYSTITIS WITHOUT OBSTRUCTION: ICD-10-CM

## 2021-11-10 DIAGNOSIS — Z94.0 KIDNEY TRANSPLANT STATUS: Chronic | ICD-10-CM

## 2021-11-10 DIAGNOSIS — Z94.0 KIDNEY TRANSPLANT STATUS: ICD-10-CM

## 2021-11-10 LAB — BLD GP AB SCN SERPL QL: SIGNIFICANT CHANGE UP

## 2021-11-10 PROCEDURE — 86900 BLOOD TYPING SEROLOGIC ABO: CPT

## 2021-11-10 PROCEDURE — G0463: CPT

## 2021-11-10 PROCEDURE — 36415 COLL VENOUS BLD VENIPUNCTURE: CPT

## 2021-11-10 PROCEDURE — 86901 BLOOD TYPING SEROLOGIC RH(D): CPT

## 2021-11-10 PROCEDURE — 86850 RBC ANTIBODY SCREEN: CPT

## 2021-11-10 NOTE — H&P PST ADULT - PROBLEM SELECTOR PLAN 6
IMPROVE VTE Individual Risk Assessment  RISK                                                                Points  [  ] Previous VTE                                                  3  [  ] Thrombophilia                                               2  [  ] Lower limb paralysis                                      2        (unable to hold up >15 seconds)    [  ] Current Cancer                                              2         (within 6 months)  [  ] Immobilization > 24 hrs                                1  [  ] ICU/CCU stay > 24 hours                              1  [  ] Age > 60                                                      1  IMPROVE VTE Score _____2____    PPI for GI prophylaxis  Heparin for DVT PPX Patine was never diagnosed with TIFFANIE, stop bang score is 3. Patient is at intermediate risk for TIFFANIE. Maintain christine and post surgical precautions.

## 2021-11-10 NOTE — H&P PST ADULT - HISTORY OF PRESENT ILLNESS
81 year old male with PMH of CKD5 s/p kidney transplant on 2016, hypertension, diabetes  81 year old male with PMH of CKD5 s/p kidney transplant on 2016, hypertension, diabetes present to [facility] for surgical evaluation. Patient was diagnosed with cholecystitis last month and is now scheduled for laparoscopic cholecystectomy with intra op cholangiogram possible on 11/17/21.

## 2021-11-10 NOTE — H&P PST ADULT - PROBLEM SELECTOR PLAN 5
- Patient has hx of DM on Metformin at home  - Holding home po medications  - Started on HSS  - Fingerstick before meals and at bedtime  - DASH diet with consistent carb  - Target -180  - F/u A1c Patient and daughter in law instructed to hold Aspirin from today on. Both verbalized understanding of instructions.

## 2021-11-10 NOTE — H&P PST ADULT - ASSESSMENT
81 years old male with   1. Cholecystitis  2. HTN  3. DM  4. Hx of renal transplant  5. Current Aspirin use 81 years old male with   1. Cholecystitis  2. HTN  3. DM  4. Hx of renal transplant  5. Current Aspirin use  6. TIFFANIE stop bang score is 3

## 2021-11-10 NOTE — H&P PST ADULT - PRESSURE ULCER(S)
Addended by: Juli Post on: 8/9/2021 04:11 PM     Modules accepted: Orders
Addended by: Rodney Dean on: 8/9/2021 02:08 PM     Modules accepted: Orders
Addended by: Sayra Rdz on: 8/9/2021 04:13 PM     Modules accepted: Orders
no

## 2021-11-10 NOTE — H&P PST ADULT - PROBLEM SELECTOR PLAN 3
- Patient has hx of renal transplantation on Tacrolimus and Mycophenolate  - C/w home meds   - F/u Tacrolimus level   - Monitor BMP daily  - Nephro Dr Mccartney consulted Patient instructed to continue Metformin daily. Hold the morning of surgery to prevent hypoglycemia.

## 2021-11-10 NOTE — H&P PST ADULT - PROBLEM SELECTOR PLAN 2
-Patient with elevated LFTS most likely 2/2 cholelithiasis   - F/u GGT, Bilirubin, Hep B, C  - US abdomen and CT abdomen noted   - Monitor LFTs daily  - GI Dr Ramirez consulted, planned MRCP tomorrow Patient instructed to continue Valsartan as prescribed. Take the morning of surgery with a sips of water.

## 2021-11-10 NOTE — H&P PST ADULT - PROBLEM SELECTOR PLAN 1
Patient is scheduled fo laparoscopic cholecystectomy with intra op cholangiogram  possible on 11/17/21. Preop instructions given both to patient and daughter in law both verbally and written. Patient instructed to take a shower the morning of surgery with chlorhexidene wash.

## 2021-11-10 NOTE — H&P PST ADULT - PROBLEM SELECTOR PLAN 4
- Patient has history of Hypertension not on any meds at home   - DASH diet  - Monitor BP and start meds as needed Instructed to continue daily of Tacrolimus and Mycophenolate as prescribed.

## 2021-11-13 DIAGNOSIS — Z01.818 ENCOUNTER FOR OTHER PREPROCEDURAL EXAMINATION: ICD-10-CM

## 2021-11-14 ENCOUNTER — APPOINTMENT (OUTPATIENT)
Dept: DISASTER EMERGENCY | Facility: CLINIC | Age: 81
End: 2021-11-14

## 2021-11-15 LAB — SARS-COV-2 N GENE NPH QL NAA+PROBE: NOT DETECTED

## 2021-11-16 ENCOUNTER — TRANSCRIPTION ENCOUNTER (OUTPATIENT)
Age: 81
End: 2021-11-16

## 2021-11-17 ENCOUNTER — INPATIENT (INPATIENT)
Facility: HOSPITAL | Age: 81
LOS: 2 days | Discharge: HOME | DRG: 418 | End: 2021-11-20
Attending: SPECIALIST | Admitting: SPECIALIST
Payer: MEDICAID

## 2021-11-17 ENCOUNTER — RESULT REVIEW (OUTPATIENT)
Age: 81
End: 2021-11-17

## 2021-11-17 ENCOUNTER — APPOINTMENT (OUTPATIENT)
Dept: SURGERY | Facility: HOSPITAL | Age: 81
End: 2021-11-17
Payer: MEDICAID

## 2021-11-17 VITALS
WEIGHT: 145.95 LBS | TEMPERATURE: 98 F | DIASTOLIC BLOOD PRESSURE: 68 MMHG | HEART RATE: 84 BPM | HEIGHT: 61 IN | RESPIRATION RATE: 16 BRPM | OXYGEN SATURATION: 100 % | SYSTOLIC BLOOD PRESSURE: 118 MMHG

## 2021-11-17 DIAGNOSIS — Z01.818 ENCOUNTER FOR OTHER PREPROCEDURAL EXAMINATION: ICD-10-CM

## 2021-11-17 DIAGNOSIS — K81.9 CHOLECYSTITIS, UNSPECIFIED: ICD-10-CM

## 2021-11-17 DIAGNOSIS — Z94.0 KIDNEY TRANSPLANT STATUS: Chronic | ICD-10-CM

## 2021-11-17 DIAGNOSIS — K80.50 CALCULUS OF BILE DUCT WITHOUT CHOLANGITIS OR CHOLECYSTITIS WITHOUT OBSTRUCTION: ICD-10-CM

## 2021-11-17 LAB
ANION GAP SERPL CALC-SCNC: 7 MMOL/L — SIGNIFICANT CHANGE UP (ref 5–17)
BLD GP AB SCN SERPL QL: SIGNIFICANT CHANGE UP
BUN SERPL-MCNC: 21 MG/DL — HIGH (ref 7–18)
CALCIUM SERPL-MCNC: 9.4 MG/DL — SIGNIFICANT CHANGE UP (ref 8.4–10.5)
CHLORIDE SERPL-SCNC: 109 MMOL/L — HIGH (ref 96–108)
CO2 SERPL-SCNC: 24 MMOL/L — SIGNIFICANT CHANGE UP (ref 22–31)
CREAT SERPL-MCNC: 1.07 MG/DL — SIGNIFICANT CHANGE UP (ref 0.5–1.3)
GLUCOSE BLDC GLUCOMTR-MCNC: 108 MG/DL — HIGH (ref 70–99)
GLUCOSE BLDC GLUCOMTR-MCNC: 150 MG/DL — HIGH (ref 70–99)
GLUCOSE SERPL-MCNC: 109 MG/DL — HIGH (ref 70–99)
POTASSIUM SERPL-MCNC: 4.1 MMOL/L — SIGNIFICANT CHANGE UP (ref 3.5–5.3)
POTASSIUM SERPL-SCNC: 4.1 MMOL/L — SIGNIFICANT CHANGE UP (ref 3.5–5.3)
SODIUM SERPL-SCNC: 140 MMOL/L — SIGNIFICANT CHANGE UP (ref 135–145)

## 2021-11-17 PROCEDURE — 88304 TISSUE EXAM BY PATHOLOGIST: CPT | Mod: 26

## 2021-11-17 PROCEDURE — 47563 LAPARO CHOLECYSTECTOMY/GRAPH: CPT

## 2021-11-17 PROCEDURE — 47563 LAPARO CHOLECYSTECTOMY/GRAPH: CPT | Mod: AS

## 2021-11-17 RX ORDER — DEXTROSE 50 % IN WATER 50 %
25 SYRINGE (ML) INTRAVENOUS ONCE
Refills: 0 | Status: DISCONTINUED | OUTPATIENT
Start: 2021-11-17 | End: 2021-11-20

## 2021-11-17 RX ORDER — SODIUM CHLORIDE 9 MG/ML
3 INJECTION INTRAMUSCULAR; INTRAVENOUS; SUBCUTANEOUS EVERY 8 HOURS
Refills: 0 | Status: DISCONTINUED | OUTPATIENT
Start: 2021-11-17 | End: 2021-11-17

## 2021-11-17 RX ORDER — GLUCAGON INJECTION, SOLUTION 0.5 MG/.1ML
1 INJECTION, SOLUTION SUBCUTANEOUS ONCE
Refills: 0 | Status: DISCONTINUED | OUTPATIENT
Start: 2021-11-17 | End: 2021-11-20

## 2021-11-17 RX ORDER — ACETAMINOPHEN 500 MG
650 TABLET ORAL EVERY 6 HOURS
Refills: 0 | Status: DISCONTINUED | OUTPATIENT
Start: 2021-11-17 | End: 2021-11-20

## 2021-11-17 RX ORDER — MYCOPHENOLATE MOFETIL 250 MG/1
1 CAPSULE ORAL
Qty: 0 | Refills: 0 | DISCHARGE

## 2021-11-17 RX ORDER — SODIUM CHLORIDE 9 MG/ML
1000 INJECTION, SOLUTION INTRAVENOUS
Refills: 0 | Status: DISCONTINUED | OUTPATIENT
Start: 2021-11-17 | End: 2021-11-20

## 2021-11-17 RX ORDER — CHLORHEXIDINE GLUCONATE 213 G/1000ML
1 SOLUTION TOPICAL ONCE
Refills: 0 | Status: COMPLETED | OUTPATIENT
Start: 2021-11-17 | End: 2021-11-17

## 2021-11-17 RX ORDER — FENTANYL CITRATE 50 UG/ML
25 INJECTION INTRAVENOUS
Refills: 0 | Status: DISCONTINUED | OUTPATIENT
Start: 2021-11-17 | End: 2021-11-17

## 2021-11-17 RX ORDER — INSULIN LISPRO 100/ML
VIAL (ML) SUBCUTANEOUS AT BEDTIME
Refills: 0 | Status: DISCONTINUED | OUTPATIENT
Start: 2021-11-17 | End: 2021-11-20

## 2021-11-17 RX ORDER — TAMSULOSIN HYDROCHLORIDE 0.4 MG/1
0.4 CAPSULE ORAL AT BEDTIME
Refills: 0 | Status: DISCONTINUED | OUTPATIENT
Start: 2021-11-17 | End: 2021-11-20

## 2021-11-17 RX ORDER — FENTANYL CITRATE 50 UG/ML
50 INJECTION INTRAVENOUS
Refills: 0 | Status: DISCONTINUED | OUTPATIENT
Start: 2021-11-17 | End: 2021-11-17

## 2021-11-17 RX ORDER — TAMSULOSIN HYDROCHLORIDE 0.4 MG/1
1 CAPSULE ORAL
Qty: 0 | Refills: 0 | DISCHARGE

## 2021-11-17 RX ORDER — ASPIRIN/CALCIUM CARB/MAGNESIUM 324 MG
1 TABLET ORAL
Qty: 0 | Refills: 0 | DISCHARGE

## 2021-11-17 RX ORDER — MYCOPHENOLATE MOFETIL 250 MG/1
750 CAPSULE ORAL
Refills: 0 | Status: DISCONTINUED | OUTPATIENT
Start: 2021-11-17 | End: 2021-11-20

## 2021-11-17 RX ORDER — TACROLIMUS 5 MG/1
1 CAPSULE ORAL EVERY 12 HOURS
Refills: 0 | Status: DISCONTINUED | OUTPATIENT
Start: 2021-11-17 | End: 2021-11-20

## 2021-11-17 RX ORDER — VALSARTAN 80 MG/1
80 TABLET ORAL DAILY
Refills: 0 | Status: DISCONTINUED | OUTPATIENT
Start: 2021-11-17 | End: 2021-11-20

## 2021-11-17 RX ORDER — VALSARTAN 80 MG/1
1 TABLET ORAL
Qty: 0 | Refills: 0 | DISCHARGE

## 2021-11-17 RX ORDER — DEXTROSE 50 % IN WATER 50 %
12.5 SYRINGE (ML) INTRAVENOUS ONCE
Refills: 0 | Status: DISCONTINUED | OUTPATIENT
Start: 2021-11-17 | End: 2021-11-20

## 2021-11-17 RX ORDER — MYCOPHENOLATE MOFETIL 250 MG/1
500 CAPSULE ORAL
Refills: 0 | Status: DISCONTINUED | OUTPATIENT
Start: 2021-11-17 | End: 2021-11-17

## 2021-11-17 RX ORDER — HEPARIN SODIUM 5000 [USP'U]/ML
5000 INJECTION INTRAVENOUS; SUBCUTANEOUS EVERY 8 HOURS
Refills: 0 | Status: DISCONTINUED | OUTPATIENT
Start: 2021-11-17 | End: 2021-11-20

## 2021-11-17 RX ORDER — ASPIRIN/CALCIUM CARB/MAGNESIUM 324 MG
81 TABLET ORAL DAILY
Refills: 0 | Status: DISCONTINUED | OUTPATIENT
Start: 2021-11-17 | End: 2021-11-20

## 2021-11-17 RX ORDER — METFORMIN HYDROCHLORIDE 850 MG/1
1 TABLET ORAL
Qty: 0 | Refills: 0 | DISCHARGE

## 2021-11-17 RX ORDER — TACROLIMUS 5 MG/1
1 CAPSULE ORAL
Qty: 0 | Refills: 0 | DISCHARGE

## 2021-11-17 RX ORDER — MYCOPHENOLATE MOFETIL 250 MG/1
250 CAPSULE ORAL
Refills: 0 | Status: DISCONTINUED | OUTPATIENT
Start: 2021-11-17 | End: 2021-11-17

## 2021-11-17 RX ORDER — INSULIN LISPRO 100/ML
VIAL (ML) SUBCUTANEOUS
Refills: 0 | Status: DISCONTINUED | OUTPATIENT
Start: 2021-11-17 | End: 2021-11-20

## 2021-11-17 RX ORDER — DEXTROSE 50 % IN WATER 50 %
15 SYRINGE (ML) INTRAVENOUS ONCE
Refills: 0 | Status: DISCONTINUED | OUTPATIENT
Start: 2021-11-17 | End: 2021-11-20

## 2021-11-17 RX ADMIN — MYCOPHENOLATE MOFETIL 750 MILLIGRAM(S): 250 CAPSULE ORAL at 17:38

## 2021-11-17 RX ADMIN — TACROLIMUS 1 MILLIGRAM(S): 5 CAPSULE ORAL at 17:38

## 2021-11-17 RX ADMIN — CHLORHEXIDINE GLUCONATE 1 APPLICATION(S): 213 SOLUTION TOPICAL at 10:17

## 2021-11-17 RX ADMIN — TAMSULOSIN HYDROCHLORIDE 0.4 MILLIGRAM(S): 0.4 CAPSULE ORAL at 22:16

## 2021-11-17 RX ADMIN — SODIUM CHLORIDE 3 MILLILITER(S): 9 INJECTION INTRAMUSCULAR; INTRAVENOUS; SUBCUTANEOUS at 10:17

## 2021-11-17 RX ADMIN — HEPARIN SODIUM 5000 UNIT(S): 5000 INJECTION INTRAVENOUS; SUBCUTANEOUS at 22:17

## 2021-11-17 RX ADMIN — VALSARTAN 80 MILLIGRAM(S): 80 TABLET ORAL at 17:38

## 2021-11-17 RX ADMIN — Medication 81 MILLIGRAM(S): at 17:29

## 2021-11-17 RX ADMIN — FENTANYL CITRATE 50 MICROGRAM(S): 50 INJECTION INTRAVENOUS at 13:34

## 2021-11-17 NOTE — BRIEF OPERATIVE NOTE - NSICDXBRIEFPROCEDURE_GEN_ALL_CORE_FT
PROCEDURES:  Laparoscopic cholecystectomy with intraoperative cholangiography 17-Nov-2021 12:33:00  Marily Wilson

## 2021-11-17 NOTE — CHART NOTE - NSCHARTNOTEFT_GEN_A_CORE
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  patient known to the renal service from a recent hspitalization a month prior.  admitted for elective cholecystectomy  Serum Creatinine at goal.   on cellcept 750mg two times per day and tacrolimus 1mg two times a day  check  levels in AM  full consult to follow

## 2021-11-17 NOTE — PROGRESS NOTE ADULT - SUBJECTIVE AND OBJECTIVE BOX
INTERVAL HPI/OVERNIGHT EVENTS:  Pt seen and examined at bedside.   Pt resting comfortably. No acute complaints.   Tolerating diet.   Denies N/V  Tolerating pain with meds   Voided postop     MEDICATIONS  (STANDING):  aspirin enteric coated 81 milliGRAM(s) Oral daily  dextrose 40% Gel 15 Gram(s) Oral once  dextrose 5%. 1000 milliLiter(s) (50 mL/Hr) IV Continuous <Continuous>  dextrose 5%. 1000 milliLiter(s) (100 mL/Hr) IV Continuous <Continuous>  dextrose 50% Injectable 25 Gram(s) IV Push once  dextrose 50% Injectable 12.5 Gram(s) IV Push once  dextrose 50% Injectable 25 Gram(s) IV Push once  glucagon  Injectable 1 milliGRAM(s) IntraMuscular once  heparin   Injectable 5000 Unit(s) SubCutaneous every 8 hours  insulin lispro (ADMELOG) corrective regimen sliding scale   SubCutaneous three times a day before meals  insulin lispro (ADMELOG) corrective regimen sliding scale   SubCutaneous at bedtime  mycophenolate mofetil 750 milliGRAM(s) Oral two times a day  tacrolimus 1 milliGRAM(s) Oral every 12 hours  tamsulosin 0.4 milliGRAM(s) Oral at bedtime  valsartan 80 milliGRAM(s) Oral daily    MEDICATIONS  (PRN):  acetaminophen     Tablet .. 650 milliGRAM(s) Oral every 6 hours PRN Temp greater or equal to 38C (100.4F), Mild Pain (1 - 3)      Vital Signs Last 24 Hrs  T(C): 36.4 (17 Nov 2021 15:37), Max: 36.8 (17 Nov 2021 09:56)  T(F): 97.6 (17 Nov 2021 15:37), Max: 98.2 (17 Nov 2021 09:56)  HR: 75 (17 Nov 2021 15:37) (74 - 84)  BP: 133/76 (17 Nov 2021 15:37) (118/68 - 144/76)  BP(mean): 93 (17 Nov 2021 14:45) (92 - 97)  RR: 18 (17 Nov 2021 15:37) (12 - 18)  SpO2: 95% (17 Nov 2021 15:37) (95% - 100%)    Physical:  General: A&Ox3. NAD.  Respirations: Unlabored   Abdomen: Softly distended, appropriate incisional tenderness, dressings C/D/I, JULIUS in place with SS drainage, output noted     I&O's Detail    17 Nov 2021 07:01  -  17 Nov 2021 17:38  --------------------------------------------------------  IN:    Sodium Chloride 0.9% Bolus: 150 mL  Total IN: 150 mL    OUT:  Total OUT: 0 mL    Total NET: 150 mL          LABS:            11-17    140  |  109<H>  |  21<H>  ----------------------------<  109<H>  4.1   |  24  |  1.07    Ca    9.4      17 Nov 2021 09:43         INTERVAL HPI/OVERNIGHT EVENTS:  Pt seen and examined at bedside.   Pt resting comfortably. Tolerating pain with meds   Tolerating CLD  Denies N/V  Voided postop     MEDICATIONS  (STANDING):  aspirin enteric coated 81 milliGRAM(s) Oral daily  dextrose 40% Gel 15 Gram(s) Oral once  dextrose 5%. 1000 milliLiter(s) (50 mL/Hr) IV Continuous <Continuous>  dextrose 5%. 1000 milliLiter(s) (100 mL/Hr) IV Continuous <Continuous>  dextrose 50% Injectable 25 Gram(s) IV Push once  dextrose 50% Injectable 12.5 Gram(s) IV Push once  dextrose 50% Injectable 25 Gram(s) IV Push once  glucagon  Injectable 1 milliGRAM(s) IntraMuscular once  heparin   Injectable 5000 Unit(s) SubCutaneous every 8 hours  insulin lispro (ADMELOG) corrective regimen sliding scale   SubCutaneous three times a day before meals  insulin lispro (ADMELOG) corrective regimen sliding scale   SubCutaneous at bedtime  mycophenolate mofetil 750 milliGRAM(s) Oral two times a day  tacrolimus 1 milliGRAM(s) Oral every 12 hours  tamsulosin 0.4 milliGRAM(s) Oral at bedtime  valsartan 80 milliGRAM(s) Oral daily    MEDICATIONS  (PRN):  acetaminophen     Tablet .. 650 milliGRAM(s) Oral every 6 hours PRN Temp greater or equal to 38C (100.4F), Mild Pain (1 - 3)      Vital Signs Last 24 Hrs  T(C): 36.4 (17 Nov 2021 15:37), Max: 36.8 (17 Nov 2021 09:56)  T(F): 97.6 (17 Nov 2021 15:37), Max: 98.2 (17 Nov 2021 09:56)  HR: 75 (17 Nov 2021 15:37) (74 - 84)  BP: 133/76 (17 Nov 2021 15:37) (118/68 - 144/76)  BP(mean): 93 (17 Nov 2021 14:45) (92 - 97)  RR: 18 (17 Nov 2021 15:37) (12 - 18)  SpO2: 95% (17 Nov 2021 15:37) (95% - 100%)    Physical:  General: A&Ox3. NAD.  Respirations: Unlabored   Abdomen: Softly distended, appropriate incisional tenderness, dressings C/D/I, JULIUS in place with SS drainage, output noted     I&O's Detail    17 Nov 2021 07:01  -  17 Nov 2021 17:38  --------------------------------------------------------  IN:    Sodium Chloride 0.9% Bolus: 150 mL  Total IN: 150 mL    OUT:  Total OUT: 0 mL    Total NET: 150 mL          LABS:            11-17    140  |  109<H>  |  21<H>  ----------------------------<  109<H>  4.1   |  24  |  1.07    Ca    9.4      17 Nov 2021 09:43

## 2021-11-17 NOTE — PROGRESS NOTE ADULT - SUBJECTIVE AND OBJECTIVE BOX
INTERVAL HPI/OVERNIGHT EVENTS:  Pt stable, alert.  C/O incisional pain.  Tolerating PO fluids.    Vital Signs Last 24 Hrs  T(C): 36.4 (17 Nov 2021 15:37), Max: 36.8 (17 Nov 2021 09:56)  T(F): 97.6 (17 Nov 2021 15:37), Max: 98.2 (17 Nov 2021 09:56)  HR: 75 (17 Nov 2021 15:37) (74 - 84)  BP: 133/76 (17 Nov 2021 15:37) (118/68 - 144/76)  BP(mean): 93 (17 Nov 2021 14:45) (92 - 97)  RR: 18 (17 Nov 2021 15:37) (12 - 18)  SpO2: 95% (17 Nov 2021 15:37) (95% - 100%)    Physical:  Abdomen: Soft nondistended, nontender.  Port sites clean.  Luis serosanguinous drainage.    I&O's Summary    17 Nov 2021 07:01  -  17 Nov 2021 17:25  --------------------------------------------------------  IN: 150 mL / OUT: 0 mL / NET: 150 mL        LABS:            11-17    140  |  109<H>  |  21<H>  ----------------------------<  109<H>  4.1   |  24  |  1.07    Ca    9.4      17 Nov 2021 09:43

## 2021-11-18 LAB
ALBUMIN SERPL ELPH-MCNC: 3.6 G/DL — SIGNIFICANT CHANGE UP (ref 3.5–5)
ALP SERPL-CCNC: 84 U/L — SIGNIFICANT CHANGE UP (ref 40–120)
ALT FLD-CCNC: 31 U/L DA — SIGNIFICANT CHANGE UP (ref 10–60)
ANION GAP SERPL CALC-SCNC: 7 MMOL/L — SIGNIFICANT CHANGE UP (ref 5–17)
APTT BLD: 31.6 SEC — SIGNIFICANT CHANGE UP (ref 27.5–35.5)
AST SERPL-CCNC: 24 U/L — SIGNIFICANT CHANGE UP (ref 10–40)
BILIRUB DIRECT SERPL-MCNC: 0.5 MG/DL — HIGH (ref 0–0.2)
BILIRUB INDIRECT FLD-MCNC: 1.8 MG/DL — HIGH (ref 0.2–1)
BILIRUB SERPL-MCNC: 2.3 MG/DL — HIGH (ref 0.2–1.2)
BUN SERPL-MCNC: 15 MG/DL — SIGNIFICANT CHANGE UP (ref 7–18)
CALCIUM SERPL-MCNC: 8.9 MG/DL — SIGNIFICANT CHANGE UP (ref 8.4–10.5)
CHLORIDE SERPL-SCNC: 106 MMOL/L — SIGNIFICANT CHANGE UP (ref 96–108)
CO2 SERPL-SCNC: 26 MMOL/L — SIGNIFICANT CHANGE UP (ref 22–31)
COVID-19 NUCLEOCAPSID GAM AB INTERP: NEGATIVE — SIGNIFICANT CHANGE UP
COVID-19 NUCLEOCAPSID TOTAL GAM ANTIBODY RESULT: 0.75 INDEX — SIGNIFICANT CHANGE UP
COVID-19 SPIKE DOMAIN AB INTERP: POSITIVE
COVID-19 SPIKE DOMAIN ANTIBODY RESULT: >250 U/ML — HIGH
CREAT SERPL-MCNC: 0.87 MG/DL — SIGNIFICANT CHANGE UP (ref 0.5–1.3)
GLUCOSE BLDC GLUCOMTR-MCNC: 100 MG/DL — HIGH (ref 70–99)
GLUCOSE BLDC GLUCOMTR-MCNC: 105 MG/DL — HIGH (ref 70–99)
GLUCOSE BLDC GLUCOMTR-MCNC: 135 MG/DL — HIGH (ref 70–99)
GLUCOSE BLDC GLUCOMTR-MCNC: 145 MG/DL — HIGH (ref 70–99)
GLUCOSE BLDC GLUCOMTR-MCNC: 99 MG/DL — SIGNIFICANT CHANGE UP (ref 70–99)
GLUCOSE BLDC GLUCOMTR-MCNC: 99 MG/DL — SIGNIFICANT CHANGE UP (ref 70–99)
GLUCOSE SERPL-MCNC: 107 MG/DL — HIGH (ref 70–99)
HCT VFR BLD CALC: 38.5 % — LOW (ref 39–50)
HGB BLD-MCNC: 12.4 G/DL — LOW (ref 13–17)
INR BLD: 1.09 RATIO — SIGNIFICANT CHANGE UP (ref 0.88–1.16)
MCHC RBC-ENTMCNC: 29.8 PG — SIGNIFICANT CHANGE UP (ref 27–34)
MCHC RBC-ENTMCNC: 32.2 GM/DL — SIGNIFICANT CHANGE UP (ref 32–36)
MCV RBC AUTO: 92.5 FL — SIGNIFICANT CHANGE UP (ref 80–100)
NRBC # BLD: 0 /100 WBCS — SIGNIFICANT CHANGE UP (ref 0–0)
PLATELET # BLD AUTO: 168 K/UL — SIGNIFICANT CHANGE UP (ref 150–400)
POTASSIUM SERPL-MCNC: 4.2 MMOL/L — SIGNIFICANT CHANGE UP (ref 3.5–5.3)
POTASSIUM SERPL-SCNC: 4.2 MMOL/L — SIGNIFICANT CHANGE UP (ref 3.5–5.3)
PROT SERPL-MCNC: 6.8 G/DL — SIGNIFICANT CHANGE UP (ref 6–8.3)
PROTHROM AB SERPL-ACNC: 12.9 SEC — SIGNIFICANT CHANGE UP (ref 10.6–13.6)
RBC # BLD: 4.16 M/UL — LOW (ref 4.2–5.8)
RBC # FLD: 13.7 % — SIGNIFICANT CHANGE UP (ref 10.3–14.5)
SARS-COV-2 IGG+IGM SERPL QL IA: 0.75 INDEX — SIGNIFICANT CHANGE UP
SARS-COV-2 IGG+IGM SERPL QL IA: >250 U/ML — HIGH
SARS-COV-2 IGG+IGM SERPL QL IA: NEGATIVE — SIGNIFICANT CHANGE UP
SARS-COV-2 IGG+IGM SERPL QL IA: POSITIVE
SODIUM SERPL-SCNC: 139 MMOL/L — SIGNIFICANT CHANGE UP (ref 135–145)
TACROLIMUS SERPL-MCNC: 5 NG/ML — SIGNIFICANT CHANGE UP
WBC # BLD: 7.1 K/UL — SIGNIFICANT CHANGE UP (ref 3.8–10.5)
WBC # FLD AUTO: 7.1 K/UL — SIGNIFICANT CHANGE UP (ref 3.8–10.5)

## 2021-11-18 RX ADMIN — HEPARIN SODIUM 5000 UNIT(S): 5000 INJECTION INTRAVENOUS; SUBCUTANEOUS at 14:16

## 2021-11-18 RX ADMIN — TACROLIMUS 1 MILLIGRAM(S): 5 CAPSULE ORAL at 17:41

## 2021-11-18 RX ADMIN — VALSARTAN 80 MILLIGRAM(S): 80 TABLET ORAL at 14:16

## 2021-11-18 RX ADMIN — MYCOPHENOLATE MOFETIL 750 MILLIGRAM(S): 250 CAPSULE ORAL at 17:41

## 2021-11-18 RX ADMIN — TAMSULOSIN HYDROCHLORIDE 0.4 MILLIGRAM(S): 0.4 CAPSULE ORAL at 22:10

## 2021-11-18 RX ADMIN — HEPARIN SODIUM 5000 UNIT(S): 5000 INJECTION INTRAVENOUS; SUBCUTANEOUS at 22:25

## 2021-11-18 RX ADMIN — TACROLIMUS 1 MILLIGRAM(S): 5 CAPSULE ORAL at 06:12

## 2021-11-18 RX ADMIN — MYCOPHENOLATE MOFETIL 750 MILLIGRAM(S): 250 CAPSULE ORAL at 06:12

## 2021-11-18 RX ADMIN — HEPARIN SODIUM 5000 UNIT(S): 5000 INJECTION INTRAVENOUS; SUBCUTANEOUS at 06:11

## 2021-11-18 NOTE — CONSULT NOTE ADULT - SUBJECTIVE AND OBJECTIVE BOX
Patient is a 81y old  Male who presents with a chief complaint of   pt seen in icu [  ], reg med floor [   ], bed [  ], chair at bedside [   ], a+o x3 [  ], lethargic [  ],  nad [  ]    garcia [  ], ngt [  ], peg [  ], et tube [  ], cent line [  ], picc line [  ]        Allergies    No Known Allergies        Vitals    T(F): 98.4 (11-18-21 @ 05:46), Max: 98.4 (11-17-21 @ 20:49)  HR: 72 (11-18-21 @ 05:46) (72 - 84)  BP: 118/55 (11-18-21 @ 05:46) (118/55 - 144/76)  RR: 18 (11-18-21 @ 05:46) (12 - 18)  SpO2: 98% (11-18-21 @ 05:46) (95% - 100%)  Wt(kg): --  CAPILLARY BLOOD GLUCOSE      POCT Blood Glucose.: 99 mg/dL (18 Nov 2021 05:18)      Labs                          12.4   7.10  )-----------( 168      ( 18 Nov 2021 05:55 )             38.5       11-18    139  |  106  |  15  ----------------------------<  107<H>  4.2   |  26  |  0.87    Ca    8.9      18 Nov 2021 05:55    TPro  6.8  /  Alb  3.6  /  TBili  2.3<H>  /  DBili  0.5<H>  /  AST  24  /  ALT  31  /  AlkPhos  84  11-18            Clean Catch Clean Catch (Midstream)  10-06 @ 10:44   <10,000 CFU/mL Normal Urogenital Jesenia  --  --          Radiology Results      Meds    MEDICATIONS  (STANDING):  aspirin enteric coated 81 milliGRAM(s) Oral daily  dextrose 40% Gel 15 Gram(s) Oral once  dextrose 5%. 1000 milliLiter(s) (50 mL/Hr) IV Continuous <Continuous>  dextrose 5%. 1000 milliLiter(s) (100 mL/Hr) IV Continuous <Continuous>  dextrose 50% Injectable 25 Gram(s) IV Push once  dextrose 50% Injectable 12.5 Gram(s) IV Push once  dextrose 50% Injectable 25 Gram(s) IV Push once  glucagon  Injectable 1 milliGRAM(s) IntraMuscular once  heparin   Injectable 5000 Unit(s) SubCutaneous every 8 hours  insulin lispro (ADMELOG) corrective regimen sliding scale   SubCutaneous three times a day before meals  insulin lispro (ADMELOG) corrective regimen sliding scale   SubCutaneous at bedtime  mycophenolate mofetil 750 milliGRAM(s) Oral two times a day  tacrolimus 1 milliGRAM(s) Oral every 12 hours  tamsulosin 0.4 milliGRAM(s) Oral at bedtime  valsartan 80 milliGRAM(s) Oral daily      MEDICATIONS  (PRN):  acetaminophen     Tablet .. 650 milliGRAM(s) Oral every 6 hours PRN Temp greater or equal to 38C (100.4F), Mild Pain (1 - 3)      Physical Exam    Neuro :  no focal deficits  Respiratory: CTA B/L  CV: RRR, S1S2, no murmurs,   Abdominal: Soft, NT, ND +BS,  Extremities: No edema, + peripheral pulses    ASSESSMENT    Calculus of bile duct without obstruction, cholangitis, or cholecystitis    Cholecystitis    Encounter for other preprocedural examination    DM (diabetes mellitus)    Hypertension    H/O kidney transplant        PLAN     81 year old male with PMH of CKD5 s/p kidney transplant on 2016, hypertension, diabetes admitted s/p cholecystectomy for cholecystitis and found to have filling defect in cbs on intra op cholangiogram possible on 11/17/21. no c/o cp palp, cough, sob, fever chills, n/v/d.    pt seen in icu [  ], reg surg floor [ x  ], bed [ x ], chair at bedside [   ], a+o x3 [x  ], lethargic [  ],  nad [ x ]    Allergies    No Known Allergies        Vitals    T(F): 98.4 (11-18-21 @ 05:46), Max: 98.4 (11-17-21 @ 20:49)  HR: 72 (11-18-21 @ 05:46) (72 - 84)  BP: 118/55 (11-18-21 @ 05:46) (118/55 - 144/76)  RR: 18 (11-18-21 @ 05:46) (12 - 18)  SpO2: 98% (11-18-21 @ 05:46) (95% - 100%)  Wt(kg): --  CAPILLARY BLOOD GLUCOSE      POCT Blood Glucose.: 99 mg/dL (18 Nov 2021 05:18)      Labs                          12.4   7.10  )-----------( 168      ( 18 Nov 2021 05:55 )             38.5       11-18    139  |  106  |  15  ----------------------------<  107<H>  4.2   |  26  |  0.87    Ca    8.9      18 Nov 2021 05:55    TPro  6.8  /  Alb  3.6  /  TBili  2.3<H>  /  DBili  0.5<H>  /  AST  24  /  ALT  31  /  AlkPhos  84  11-18            Clean Catch Clean Catch (Midstream)  10-06 @ 10:44   <10,000 CFU/mL Normal Urogenital Jesenia  --  --          Radiology Results      Meds    MEDICATIONS  (STANDING):  aspirin enteric coated 81 milliGRAM(s) Oral daily  dextrose 40% Gel 15 Gram(s) Oral once  dextrose 5%. 1000 milliLiter(s) (50 mL/Hr) IV Continuous <Continuous>  dextrose 5%. 1000 milliLiter(s) (100 mL/Hr) IV Continuous <Continuous>  dextrose 50% Injectable 25 Gram(s) IV Push once  dextrose 50% Injectable 12.5 Gram(s) IV Push once  dextrose 50% Injectable 25 Gram(s) IV Push once  glucagon  Injectable 1 milliGRAM(s) IntraMuscular once  heparin   Injectable 5000 Unit(s) SubCutaneous every 8 hours  insulin lispro (ADMELOG) corrective regimen sliding scale   SubCutaneous three times a day before meals  insulin lispro (ADMELOG) corrective regimen sliding scale   SubCutaneous at bedtime  mycophenolate mofetil 750 milliGRAM(s) Oral two times a day  tacrolimus 1 milliGRAM(s) Oral every 12 hours  tamsulosin 0.4 milliGRAM(s) Oral at bedtime  valsartan 80 milliGRAM(s) Oral daily      MEDICATIONS  (PRN):  acetaminophen     Tablet .. 650 milliGRAM(s) Oral every 6 hours PRN Temp greater or equal to 38C (100.4F), Mild Pain (1 - 3)      Physical Exam    Neuro :  no focal deficits  Respiratory: CTA B/L  CV: RRR, S1S2, no murmurs,   Abdominal: Soft, incisional tenderness, afua with sanguinous drainage, ND +BS, scope site dressing intact  Extremities: No edema, + peripheral pulses      ASSESSMENT    Cholecystitis  choledocolithiasis  h/o DM (diabetes mellitus)  Hypertension  H/O kidney transplant      PLAN    Laparoscopic cholecystectomy with intraoperative cholangiography 17-Nov-2021   surg f/u   pt NPO sincemidnight   Pain control PRN   DVT ppx  incentive spirometer  Resume home meds   Continue AFUA, monitor output   gi cons  pt for ERCP with Dr. Ramirez  renal f/u   cont current meds

## 2021-11-18 NOTE — CONSULT NOTE ADULT - ASSESSMENT
Patient is a 81y Male whom presented to the hospital for elective cholecystectomy.   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  patient known to the renal service from a recent hospitalization a month prior.  A CT scan then revealed mild transplant hydronephrosis ( D/C transplant surgeon at [facility] and its a chronic finding)   admitted for elective cholecystectomy  Will be getting ERCP today.  Serum Creatinine at goal.   on cellcept 750mg two times per day and tacrolimus 1mg two times a day      A/P:  s/p DDRTXP. continue current immunosuppresive regimen cellcept 750mg two times per day and tacrolimus 1mg two times per day  avoid NSAID's and IV contrast media  daily BMP  lytes/acid base and HGB at goal   s/p cholecystectomy- noted for ERCP     Thanks for the consult Patient is a 81y Male whom presented to the hospital for elective cholecystectomy.   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  patient known to the renal service from a recent hospitalization a month prior.  A CT scan then revealed mild transplant hydronephrosis ( [place] [facility] [place]   admitted for elective cholecystectomy  Will be getting ERCP today.  Serum Creatinine at goal.   on cellcept 750mg two times per day and tacrolimus 1mg two times a day      A/P:  s/p DDRTXP. continue current immunosuppresive regimen cellcept 750mg two times per day and tacrolimus 1mg two times per day  avoid NSAID's and IV contrast media  daily BMP  lytes/acid base and HGB at goal   s/p cholecystectomy- noted for ERCP     Thanks for the consult

## 2021-11-18 NOTE — CONSULT NOTE ADULT - SUBJECTIVE AND OBJECTIVE BOX
[facility] : Progress Note :: [phone], (office [phone]),   Dr Mccartney / Dr Carson / Dr Trujillo / Dr Hamilton / Dr Deny BUSBY / Dr Sinclair / Dr Robison / Dr Danial trimble  _____________________________________________________________________________________________  Patient is a 81y Male whom presented to the hospital for elective cholecystectomy.   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  patient known to the renal service from a recent hospitalization a month prior.  A CT scan then revealed mild transplant hydronephrosis ( D/C transplant surgeon at [facility] [place] and its a chronic finding)   admitted for elective cholecystectomy  Will be getting ERCP today.  Serum Creatinine at goal.   on cellcept 750mg two times per day and tacrolimus 1mg two times a day        PAST MEDICAL & SURGICAL HISTORY:  DM (diabetes mellitus)    Hypertension    H/O kidney transplant      No Known Allergies    Home Medications Reviewed  Hospital Medications:   MEDICATIONS  (STANDING):  aspirin enteric coated 81 milliGRAM(s) Oral daily  dextrose 40% Gel 15 Gram(s) Oral once  dextrose 5%. 1000 milliLiter(s) (50 mL/Hr) IV Continuous <Continuous>  dextrose 5%. 1000 milliLiter(s) (100 mL/Hr) IV Continuous <Continuous>  dextrose 50% Injectable 25 Gram(s) IV Push once  dextrose 50% Injectable 12.5 Gram(s) IV Push once  dextrose 50% Injectable 25 Gram(s) IV Push once  glucagon  Injectable 1 milliGRAM(s) IntraMuscular once  heparin   Injectable 5000 Unit(s) SubCutaneous every 8 hours  insulin lispro (ADMELOG) corrective regimen sliding scale   SubCutaneous three times a day before meals  insulin lispro (ADMELOG) corrective regimen sliding scale   SubCutaneous at bedtime  mycophenolate mofetil 750 milliGRAM(s) Oral two times a day  tacrolimus 1 milliGRAM(s) Oral every 12 hours  tamsulosin 0.4 milliGRAM(s) Oral at bedtime  valsartan 80 milliGRAM(s) Oral daily    SOCIAL HISTORY:  Denies ETOh,Smoking,   FAMILY HISTORY:        VITALS:  T(F): 98.4 (11-18-21 @ 05:46), Max: 98.4 (11-17-21 @ 20:49)  HR: 72 (11-18-21 @ 05:46)  BP: 118/55 (11-18-21 @ 05:46)  RR: 18 (11-18-21 @ 05:46)  SpO2: 98% (11-18-21 @ 05:46)  Wt(kg): --    11-17 @ 07:01  -  11-18 @ 07:00  --------------------------------------------------------  IN: 150 mL / OUT: 80 mL / NET: 70 mL        PHYSICAL EXAM:  Constitutional: NAD  HEENT: anicteric sclera, oropharynx clear, MMM  Neck: No JVD  Respiratory: CTAB, no wheezes, rales or rhonchi  Cardiovascular: S1, S2, RRR  Gastrointestinal: renal allograft non-tender. lap wounds dressed.   Extremities: No cyanosis or clubbing. No peripheral edema  Neurological: A/O x 3, no focal deficits  : No CVA tenderness. No garcia.   Skin: No rashes      LABS:  11-18    139  |  106  |  15  ----------------------------<  107<H>  4.2   |  26  |  0.87    Ca    8.9      18 Nov 2021 05:55    TPro  6.8  /  Alb  3.6  /  TBili  2.3<H>  /  DBili  0.5<H>  /  AST  24  /  ALT  31  /  AlkPhos  84  11-18    Creatinine Trend: 0.87 <--, 1.07 <--                        12.4   7.10  )-----------( 168      ( 18 Nov 2021 05:55 )             38.5     Urine Studies:      RADIOLOGY & ADDITIONAL STUDIES:                 [facility] : Progress Note :: [phone], (office [phone]),   Dr Mccartney / Dr Carson / Dr Trujillo / Dr Hamilton / Dr Deny BUSBY / Dr Sinclair / Dr Robison / Dr Danial trimble  _____________________________________________________________________________________________  Patient is a 81y Male whom presented to the hospital for elective cholecystectomy.   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  patient known to the renal service from a recent hospitalization a month prior.  A CT scan then revealed mild transplant hydronephrosis ( [place] [facility] [place]   admitted for elective cholecystectomy  Will be getting ERCP today.  Serum Creatinine at goal.   on cellcept 750mg two times per day and tacrolimus 1mg two times a day        PAST MEDICAL & SURGICAL HISTORY:  DM (diabetes mellitus)    Hypertension    H/O kidney transplant      No Known Allergies    Home Medications Reviewed  Hospital Medications:   MEDICATIONS  (STANDING):  aspirin enteric coated 81 milliGRAM(s) Oral daily  dextrose 40% Gel 15 Gram(s) Oral once  dextrose 5%. 1000 milliLiter(s) (50 mL/Hr) IV Continuous <Continuous>  dextrose 5%. 1000 milliLiter(s) (100 mL/Hr) IV Continuous <Continuous>  dextrose 50% Injectable 25 Gram(s) IV Push once  dextrose 50% Injectable 12.5 Gram(s) IV Push once  dextrose 50% Injectable 25 Gram(s) IV Push once  glucagon  Injectable 1 milliGRAM(s) IntraMuscular once  heparin   Injectable 5000 Unit(s) SubCutaneous every 8 hours  insulin lispro (ADMELOG) corrective regimen sliding scale   SubCutaneous three times a day before meals  insulin lispro (ADMELOG) corrective regimen sliding scale   SubCutaneous at bedtime  mycophenolate mofetil 750 milliGRAM(s) Oral two times a day  tacrolimus 1 milliGRAM(s) Oral every 12 hours  tamsulosin 0.4 milliGRAM(s) Oral at bedtime  valsartan 80 milliGRAM(s) Oral daily    SOCIAL HISTORY:  Denies ETOh,Smoking,   FAMILY HISTORY:        VITALS:  T(F): 98.4 (11-18-21 @ 05:46), Max: 98.4 (11-17-21 @ 20:49)  HR: 72 (11-18-21 @ 05:46)  BP: 118/55 (11-18-21 @ 05:46)  RR: 18 (11-18-21 @ 05:46)  SpO2: 98% (11-18-21 @ 05:46)  Wt(kg): --    11-17 @ 07:01  -  11-18 @ 07:00  --------------------------------------------------------  IN: 150 mL / OUT: 80 mL / NET: 70 mL        PHYSICAL EXAM:  Constitutional: NAD  HEENT: anicteric sclera, oropharynx clear, MMM  Neck: No JVD  Respiratory: CTAB, no wheezes, rales or rhonchi  Cardiovascular: S1, S2, RRR  Gastrointestinal: renal allograft non-tender. lap wounds dressed.   Extremities: No cyanosis or clubbing. No peripheral edema  Neurological: A/O x 3, no focal deficits  : No CVA tenderness. No garcia.   Skin: No rashes      LABS:  11-18    139  |  106  |  15  ----------------------------<  107<H>  4.2   |  26  |  0.87    Ca    8.9      18 Nov 2021 05:55    TPro  6.8  /  Alb  3.6  /  TBili  2.3<H>  /  DBili  0.5<H>  /  AST  24  /  ALT  31  /  AlkPhos  84  11-18    Creatinine Trend: 0.87 <--, 1.07 <--                        12.4   7.10  )-----------( 168      ( 18 Nov 2021 05:55 )             38.5     Urine Studies:      RADIOLOGY & ADDITIONAL STUDIES:

## 2021-11-18 NOTE — CONSULT NOTE ADULT - ASSESSMENT
81 year old male with CBD stones     CBD stones   ? retained stones versus GB stones that dropped in interval   Planned for repeat ERCP today however Postponed due to OR scheduling   ERCP tomorrow with complete stone extraction   NPO post MN

## 2021-11-18 NOTE — CONSULT NOTE ADULT - SUBJECTIVE AND OBJECTIVE BOX
Patient is a 81y old  Male who presents with a chief complaint of elective  cholecystectomy. (18 Nov 2021 13:30)     .     HPI:    HPI:          REVIEW OF SYSTEMS  Constitutional:   No fever, no fatigue, no pallor, no night sweats, no weight loss.  HEENT:   No eye pain, no vision changes, no icterus, no mouth ulcers.  Respiratory:   No shortness of breath, no cough, no respiratory distress.   Cardiovascular:   No chest pain, no palpitations.   Gastrointestinal: No abdominal pain, no nausea, no vomiting , no diahrrea, no constipation, no hematochezia,no melena.  Skin:   No rashes, no jaundice, no eczema.   Musculoskeletal:   No joint pain, no swelling, no myalgia.   Neurologic:   No headache, no seizure, no weakness.   Genitourinary:   No dysuria, no decreased urine output.  Psychiatric:  No depression, no anxiety,   Endocrine:   No thyroid disease, no diabetes.  Heme/Lymphatic:   No anemia, no blood transfusions, no lymph node enlargement, no bleeding, no bruising.  ___________________________________________________________________________________________  Allergies    No Known Allergies    Intolerances      MEDICATIONS  (STANDING):  aspirin enteric coated 81 milliGRAM(s) Oral daily  dextrose 40% Gel 15 Gram(s) Oral once  dextrose 5%. 1000 milliLiter(s) (50 mL/Hr) IV Continuous <Continuous>  dextrose 5%. 1000 milliLiter(s) (100 mL/Hr) IV Continuous <Continuous>  dextrose 50% Injectable 25 Gram(s) IV Push once  dextrose 50% Injectable 12.5 Gram(s) IV Push once  dextrose 50% Injectable 25 Gram(s) IV Push once  glucagon  Injectable 1 milliGRAM(s) IntraMuscular once  heparin   Injectable 5000 Unit(s) SubCutaneous every 8 hours  insulin lispro (ADMELOG) corrective regimen sliding scale   SubCutaneous three times a day before meals  insulin lispro (ADMELOG) corrective regimen sliding scale   SubCutaneous at bedtime  mycophenolate mofetil 750 milliGRAM(s) Oral two times a day  tacrolimus 1 milliGRAM(s) Oral every 12 hours  tamsulosin 0.4 milliGRAM(s) Oral at bedtime  valsartan 80 milliGRAM(s) Oral daily    MEDICATIONS  (PRN):  acetaminophen     Tablet .. 650 milliGRAM(s) Oral every 6 hours PRN Temp greater or equal to 38C (100.4F), Mild Pain (1 - 3)      PAST MEDICAL & SURGICAL HISTORY:  DM (diabetes mellitus)    Hypertension    H/O kidney transplant      FAMILY HISTORY:    Social History: No hsitory of : Tobacco use, IVDA, EToH  ______________________________________________________________________________________    PHYSICAL EXAM    Daily     Daily   BMI: 27.6 (11-17 @ 09:56)  Change in Weight:  Vital Signs Last 24 Hrs  T(C): 36.7 (18 Nov 2021 13:55), Max: 36.9 (17 Nov 2021 20:49)  T(F): 98 (18 Nov 2021 13:55), Max: 98.4 (17 Nov 2021 20:49)  HR: 72 (18 Nov 2021 13:55) (72 - 79)  BP: 128/69 (18 Nov 2021 13:55) (118/55 - 129/72)  BP(mean): --  RR: 17 (18 Nov 2021 13:55) (17 - 18)  SpO2: 95% (18 Nov 2021 13:55) (95% - 98%)    General:  Well developed, well nourished, alert and active, no pallor, NAD.  HEENT:    Normal appearance of conjunctiva, ears, nose, lips, oropharynx, and oral mucosa, anicteric.  Neck:  No masses, no asymmetry.  Lymph Nodes:  No lymphadenopathy.   Cardiovascular:  RRR normal S1/S2, no murmur.  Respiratory:  CTA B/L, normal respiratory effort.   Abdominal:   soft, no masses or tenderness, normoactive BS, NT/ND, no HSM.  Extremities:   No clubbing or cyanosis, normal capillary refill, no edema.   Skin:   No rash, jaundice, lesions, eczema.   Musculoskeletal:  No joint swelling, erythema or tenderness.   Neuro: No focal deficits.   Other:   _______________________________________________________________________________________________  Lab Results:                          12.4   7.10  )-----------( 168      ( 18 Nov 2021 05:55 )             38.5     11-18    139  |  106  |  15  ----------------------------<  107<H>  4.2   |  26  |  0.87    Ca    8.9      18 Nov 2021 05:55    TPro  6.8  /  Alb  3.6  /  TBili  2.3<H>  /  DBili  0.5<H>  /  AST  24  /  ALT  31  /  AlkPhos  84  11-18    LIVER FUNCTIONS - ( 18 Nov 2021 05:55 )  Alb: 3.6 g/dL / Pro: 6.8 g/dL / ALK PHOS: 84 U/L / ALT: 31 U/L DA / AST: 24 U/L / GGT: x           PT/INR - ( 18 Nov 2021 05:55 )   PT: 12.9 sec;   INR: 1.09 ratio         PTT - ( 18 Nov 2021 05:55 )  PTT:31.6 sec        Stool Results:          RADIOLOGY RESULTS:    · Note Type	Event Note  ERCP Note      S.p ERCP today. Successful stone extraction. Procedure went as planned however some spillage of contrast into the PD.   Monitor for pancreatitis   IVF   Clear liquids ok.      Electronic Signatures:  Darryn Davalos)  (Signed 07-Oct-2021 22:19)  	Authored: Note Type, Note      Last Updated: 07-Oct-2021 22:19 by Darryn Davalos)  SURGICAL PATHOLOGY:

## 2021-11-18 NOTE — PROGRESS NOTE ADULT - SUBJECTIVE AND OBJECTIVE BOX
INTERVAL HPI/OVERNIGHT EVENTS:    No acute events overnight.   Pt resting comfortably. RQ tenderness   denies n/v/f/c      MEDICATIONS  (STANDING):  aspirin enteric coated 81 milliGRAM(s) Oral daily  dextrose 40% Gel 15 Gram(s) Oral once  dextrose 5%. 1000 milliLiter(s) (50 mL/Hr) IV Continuous <Continuous>  dextrose 5%. 1000 milliLiter(s) (100 mL/Hr) IV Continuous <Continuous>  dextrose 50% Injectable 25 Gram(s) IV Push once  dextrose 50% Injectable 12.5 Gram(s) IV Push once  dextrose 50% Injectable 25 Gram(s) IV Push once  glucagon  Injectable 1 milliGRAM(s) IntraMuscular once  heparin   Injectable 5000 Unit(s) SubCutaneous every 8 hours  insulin lispro (ADMELOG) corrective regimen sliding scale   SubCutaneous three times a day before meals  insulin lispro (ADMELOG) corrective regimen sliding scale   SubCutaneous at bedtime  mycophenolate mofetil 750 milliGRAM(s) Oral two times a day  tacrolimus 1 milliGRAM(s) Oral every 12 hours  tamsulosin 0.4 milliGRAM(s) Oral at bedtime  valsartan 80 milliGRAM(s) Oral daily    MEDICATIONS  (PRN):  acetaminophen     Tablet .. 650 milliGRAM(s) Oral every 6 hours PRN Temp greater or equal to 38C (100.4F), Mild Pain (1 - 3)      Vital Signs Last 24 Hrs  T(C): 36.9 (18 Nov 2021 05:46), Max: 36.9 (17 Nov 2021 20:49)  T(F): 98.4 (18 Nov 2021 05:46), Max: 98.4 (17 Nov 2021 20:49)  HR: 72 (18 Nov 2021 05:46) (72 - 84)  BP: 118/55 (18 Nov 2021 05:46) (118/55 - 144/76)  BP(mean): 93 (17 Nov 2021 14:45) (92 - 97)  RR: 18 (18 Nov 2021 05:46) (12 - 18)  SpO2: 98% (18 Nov 2021 05:46) (95% - 100%)      PHYSICAL EXAM  General: Alert and oriented, not in acute distress  Resp: Breathing unlabored  Abdomen: Soft, nondistended, RQ ttp  : No garcia catheter, no dysuria or hematuria  Extremities: No pedal edema    I&O's Detail    17 Nov 2021 07:01  -  18 Nov 2021 07:00  --------------------------------------------------------  IN:    Sodium Chloride 0.9% Bolus: 150 mL  Total IN: 150 mL    OUT:    Bulb (mL): 80 mL  Total OUT: 80 mL    Total NET: 70 mL          LABS:                        12.4   7.10  )-----------( 168      ( 18 Nov 2021 05:55 )             38.5             11-18    139  |  106  |  15  ----------------------------<  107<H>  4.2   |  26  |  0.87    Ca    8.9      18 Nov 2021 05:55    TPro  6.8  /  Alb  3.6  /  TBili  2.3<H>  /  DBili  0.5<H>  /  AST  24  /  ALT  31  /  AlkPhos  84  11-18

## 2021-11-19 ENCOUNTER — TRANSCRIPTION ENCOUNTER (OUTPATIENT)
Age: 81
End: 2021-11-19

## 2021-11-19 LAB
ALBUMIN SERPL ELPH-MCNC: 3.6 G/DL — SIGNIFICANT CHANGE UP (ref 3.5–5)
ALP SERPL-CCNC: 97 U/L — SIGNIFICANT CHANGE UP (ref 40–120)
ALT FLD-CCNC: 29 U/L DA — SIGNIFICANT CHANGE UP (ref 10–60)
ANION GAP SERPL CALC-SCNC: 7 MMOL/L — SIGNIFICANT CHANGE UP (ref 5–17)
AST SERPL-CCNC: 21 U/L — SIGNIFICANT CHANGE UP (ref 10–40)
BASOPHILS # BLD AUTO: 0.04 K/UL — SIGNIFICANT CHANGE UP (ref 0–0.2)
BASOPHILS NFR BLD AUTO: 0.6 % — SIGNIFICANT CHANGE UP (ref 0–2)
BILIRUB SERPL-MCNC: 2.2 MG/DL — HIGH (ref 0.2–1.2)
BUN SERPL-MCNC: 13 MG/DL — SIGNIFICANT CHANGE UP (ref 7–18)
CALCIUM SERPL-MCNC: 9 MG/DL — SIGNIFICANT CHANGE UP (ref 8.4–10.5)
CHLORIDE SERPL-SCNC: 106 MMOL/L — SIGNIFICANT CHANGE UP (ref 96–108)
CO2 SERPL-SCNC: 26 MMOL/L — SIGNIFICANT CHANGE UP (ref 22–31)
CREAT SERPL-MCNC: 0.96 MG/DL — SIGNIFICANT CHANGE UP (ref 0.5–1.3)
EOSINOPHIL # BLD AUTO: 0.18 K/UL — SIGNIFICANT CHANGE UP (ref 0–0.5)
EOSINOPHIL NFR BLD AUTO: 2.8 % — SIGNIFICANT CHANGE UP (ref 0–6)
GLUCOSE BLDC GLUCOMTR-MCNC: 108 MG/DL — HIGH (ref 70–99)
GLUCOSE BLDC GLUCOMTR-MCNC: 115 MG/DL — HIGH (ref 70–99)
GLUCOSE BLDC GLUCOMTR-MCNC: 98 MG/DL — SIGNIFICANT CHANGE UP (ref 70–99)
GLUCOSE BLDC GLUCOMTR-MCNC: 99 MG/DL — SIGNIFICANT CHANGE UP (ref 70–99)
GLUCOSE SERPL-MCNC: 108 MG/DL — HIGH (ref 70–99)
HCT VFR BLD CALC: 40.3 % — SIGNIFICANT CHANGE UP (ref 39–50)
HGB BLD-MCNC: 13.2 G/DL — SIGNIFICANT CHANGE UP (ref 13–17)
IMM GRANULOCYTES NFR BLD AUTO: 0.3 % — SIGNIFICANT CHANGE UP (ref 0–1.5)
LYMPHOCYTES # BLD AUTO: 1.52 K/UL — SIGNIFICANT CHANGE UP (ref 1–3.3)
LYMPHOCYTES # BLD AUTO: 23.5 % — SIGNIFICANT CHANGE UP (ref 13–44)
MCHC RBC-ENTMCNC: 30.2 PG — SIGNIFICANT CHANGE UP (ref 27–34)
MCHC RBC-ENTMCNC: 32.8 GM/DL — SIGNIFICANT CHANGE UP (ref 32–36)
MCV RBC AUTO: 92.2 FL — SIGNIFICANT CHANGE UP (ref 80–100)
MONOCYTES # BLD AUTO: 0.76 K/UL — SIGNIFICANT CHANGE UP (ref 0–0.9)
MONOCYTES NFR BLD AUTO: 11.7 % — SIGNIFICANT CHANGE UP (ref 2–14)
NEUTROPHILS # BLD AUTO: 3.95 K/UL — SIGNIFICANT CHANGE UP (ref 1.8–7.4)
NEUTROPHILS NFR BLD AUTO: 61.1 % — SIGNIFICANT CHANGE UP (ref 43–77)
NRBC # BLD: 0 /100 WBCS — SIGNIFICANT CHANGE UP (ref 0–0)
PLATELET # BLD AUTO: 176 K/UL — SIGNIFICANT CHANGE UP (ref 150–400)
POTASSIUM SERPL-MCNC: 4.5 MMOL/L — SIGNIFICANT CHANGE UP (ref 3.5–5.3)
POTASSIUM SERPL-SCNC: 4.5 MMOL/L — SIGNIFICANT CHANGE UP (ref 3.5–5.3)
PROT SERPL-MCNC: 7.1 G/DL — SIGNIFICANT CHANGE UP (ref 6–8.3)
RBC # BLD: 4.37 M/UL — SIGNIFICANT CHANGE UP (ref 4.2–5.8)
RBC # FLD: 13.4 % — SIGNIFICANT CHANGE UP (ref 10.3–14.5)
SODIUM SERPL-SCNC: 139 MMOL/L — SIGNIFICANT CHANGE UP (ref 135–145)
WBC # BLD: 6.47 K/UL — SIGNIFICANT CHANGE UP (ref 3.8–10.5)
WBC # FLD AUTO: 6.47 K/UL — SIGNIFICANT CHANGE UP (ref 3.8–10.5)

## 2021-11-19 RX ADMIN — TACROLIMUS 1 MILLIGRAM(S): 5 CAPSULE ORAL at 17:36

## 2021-11-19 RX ADMIN — VALSARTAN 80 MILLIGRAM(S): 80 TABLET ORAL at 06:01

## 2021-11-19 RX ADMIN — TAMSULOSIN HYDROCHLORIDE 0.4 MILLIGRAM(S): 0.4 CAPSULE ORAL at 21:51

## 2021-11-19 RX ADMIN — HEPARIN SODIUM 5000 UNIT(S): 5000 INJECTION INTRAVENOUS; SUBCUTANEOUS at 17:36

## 2021-11-19 RX ADMIN — HEPARIN SODIUM 5000 UNIT(S): 5000 INJECTION INTRAVENOUS; SUBCUTANEOUS at 06:01

## 2021-11-19 RX ADMIN — MYCOPHENOLATE MOFETIL 750 MILLIGRAM(S): 250 CAPSULE ORAL at 06:01

## 2021-11-19 RX ADMIN — MYCOPHENOLATE MOFETIL 750 MILLIGRAM(S): 250 CAPSULE ORAL at 17:36

## 2021-11-19 RX ADMIN — TACROLIMUS 1 MILLIGRAM(S): 5 CAPSULE ORAL at 06:01

## 2021-11-19 RX ADMIN — HEPARIN SODIUM 5000 UNIT(S): 5000 INJECTION INTRAVENOUS; SUBCUTANEOUS at 21:51

## 2021-11-19 NOTE — DISCHARGE NOTE PROVIDER - NSDCMRMEDTOKEN_GEN_ALL_CORE_FT
aspirin 81 mg oral delayed release tablet: 1 tab(s) orally once a day  metFORMIN 500 mg oral tablet: 1 tab(s) orally 2 times a day  metFORMIN 500 mg oral tablet, extended release: 1 tab(s) orally once a day  mycophenolate mofetil 250 mg oral capsule: 1 cap(s) orally 2 times a day  mycophenolate mofetil 500 mg oral tablet: 1 tab(s) orally 2 times a day  tacrolimus 1 mg oral capsule: 1 cap(s) orally every 12 hours  tamsulosin 0.4 mg oral capsule: 1 cap(s) orally once a day  valsartan 80 mg oral tablet: 1 tab(s) orally once a day   aspirin 81 mg oral delayed release tablet: 1 tab(s) orally once a day  metFORMIN 500 mg oral tablet: 1 tab(s) orally 2 times a day  metFORMIN 500 mg oral tablet, extended release: 1 tab(s) orally once a day  mycophenolate mofetil 250 mg oral capsule: 1 cap(s) orally 2 times a day  mycophenolate mofetil 500 mg oral tablet: 1 tab(s) orally 2 times a day  Percocet 5 mg-325 mg oral tablet: 1 tab(s) orally every 6 hours, As Needed -for moderate to severe pain MDD:4 tablets   tacrolimus 1 mg oral capsule: 1 cap(s) orally every 12 hours  tamsulosin 0.4 mg oral capsule: 1 cap(s) orally once a day  valsartan 80 mg oral tablet: 1 tab(s) orally once a day

## 2021-11-19 NOTE — DISCHARGE NOTE PROVIDER - HOSPITAL COURSE
81 year old male with PMH of CKD5 s/p kidney transplant on 2016, hypertension, diabetes present to [facility] for surgical evaluation. Patient was diagnosed with cholecystitis and choledocholithiasis last month, s/p ERCP and now underwent laparoscopic cholecystectomy with intra op cholangiogram on 11/17/21. IOC was positive for CBD stone, pt was taken back to ERCP on 11/19/21. ***incomplete***   81 year old male with PMH of CKD5 s/p kidney transplant on 2016, hypertension, diabetes present to [facility] for surgical evaluation. Patient was diagnosed with cholecystitis and choledocholithiasis last month, s/p ERCP and now underwent laparoscopic cholecystectomy with intra op cholangiogram on 11/17/21. IOC was positive for CBD stone, pt was taken back to ERCP on 11/19/21.     Patient had regular diet and is now stable for discharge home.   JULIUS removed    f/u with Dr. Tsang in 1 week

## 2021-11-19 NOTE — PROGRESS NOTE ADULT - SUBJECTIVE AND OBJECTIVE BOX
[facility] : Progress Note :: [phone], (office [phone]),   Dr Mccartney / Dr Carson / Dr Trujillo / Dr Hamilton / Dr Deny BUSBY / Dr Sinclair / Dr Robison / Dr Danial trimble  _____________________________________________________________________________________________    seen pre-ERCP procedure     No Known Allergies    Hospital Medications:   MEDICATIONS  (STANDING):  aspirin enteric coated 81 milliGRAM(s) Oral daily  dextrose 40% Gel 15 Gram(s) Oral once  dextrose 5%. 1000 milliLiter(s) (50 mL/Hr) IV Continuous <Continuous>  dextrose 5%. 1000 milliLiter(s) (100 mL/Hr) IV Continuous <Continuous>  dextrose 50% Injectable 25 Gram(s) IV Push once  dextrose 50% Injectable 12.5 Gram(s) IV Push once  dextrose 50% Injectable 25 Gram(s) IV Push once  glucagon  Injectable 1 milliGRAM(s) IntraMuscular once  heparin   Injectable 5000 Unit(s) SubCutaneous every 8 hours  insulin lispro (ADMELOG) corrective regimen sliding scale   SubCutaneous three times a day before meals  insulin lispro (ADMELOG) corrective regimen sliding scale   SubCutaneous at bedtime  mycophenolate mofetil 750 milliGRAM(s) Oral two times a day  tacrolimus 1 milliGRAM(s) Oral every 12 hours  tamsulosin 0.4 milliGRAM(s) Oral at bedtime  valsartan 80 milliGRAM(s) Oral daily        VITALS:  T(F): 97.8 (11-19-21 @ 14:09), Max: 98 (11-18-21 @ 20:45)  HR: 67 (11-19-21 @ 14:09)  BP: 120/69 (11-19-21 @ 14:09)  RR: 18 (11-19-21 @ 14:09)  SpO2: 96% (11-19-21 @ 14:09)  Wt(kg): --    11-18 @ 07:01 - 11-19 @ 07:00  --------------------------------------------------------  IN: 0 mL / OUT: 30 mL / NET: -30 mL    11-19 @ 07:01 - 11-19 @ 16:07  --------------------------------------------------------  IN: 0 mL / OUT: 15 mL / NET: -15 mL        PHYSICAL EXAM:  Constitutional: NAD  HEENT: anicteric sclera, oropharynx clear.  Neck: No JVD  Respiratory: CTAB, no wheezes, rales or rhonchi  Cardiovascular: S1, S2, RRR  Gastrointestinal: BS+, soft,. RLQ renal allograft   Extremities:  No peripheral edema  Neurological: A/O x 3, no focal deficits  : No CVA tenderness. No garcia.       LABS:  11-19    139  |  106  |  13  ----------------------------<  108<H>  4.5   |  26  |  0.96    Ca    9.0      19 Nov 2021 05:48    TPro  7.1  /  Alb  3.6  /  TBili  2.2<H>  /  DBili      /  AST  21  /  ALT  29  /  AlkPhos  97  11-19    Creatinine Trend: 0.96 <--, 0.87 <--, 1.07 <--                        13.2   6.47  )-----------( 176      ( 19 Nov 2021 05:48 )             40.3     Urine Studies:      RADIOLOGY & ADDITIONAL STUDIES:

## 2021-11-19 NOTE — DISCHARGE NOTE PROVIDER - CARE PROVIDERS DIRECT ADDRESSES
[email or web address]

## 2021-11-19 NOTE — PROGRESS NOTE ADULT - ATTENDING COMMENTS
Agree with above, I have seen and examined the patient
Agree with above, I have seen and examined the patient

## 2021-11-19 NOTE — DISCHARGE NOTE PROVIDER - PROVIDER TOKENS
PROVIDER:[TOKEN:[[id number]],FOLLOWUP:[2 weeks]],PROVIDER:[TOKEN:[[id number]],FOLLOWUP:[1 month]]

## 2021-11-19 NOTE — PROGRESS NOTE ADULT - SUBJECTIVE AND OBJECTIVE BOX
Patient is a 81y old  Male who presents with a chief complaint of elective  cholecystectomy. (18 Nov 2021 13:30)    pt seen in icu [  ], reg surg floor [ x  ], bed [ x ], chair at bedside [   ], a+o x3 [x  ], lethargic [  ],    nad [ x ]      Allergies    No Known Allergies        Vitals    T(F): 97.9 (11-19-21 @ 05:50), Max: 98 (11-18-21 @ 13:55)  HR: 73 (11-19-21 @ 05:50) (72 - 76)  BP: 118/72 (11-19-21 @ 05:50) (109/65 - 128/69)  RR: 18 (11-19-21 @ 05:50) (17 - 18)  SpO2: 98% (11-19-21 @ 05:50) (95% - 98%)  Wt(kg): --  CAPILLARY BLOOD GLUCOSE      POCT Blood Glucose.: 99 mg/dL (18 Nov 2021 21:27)      Labs                          13.2   6.47  )-----------( 176      ( 19 Nov 2021 05:48 )             40.3       11-19    139  |  106  |  13  ----------------------------<  108<H>  4.5   |  26  |  0.96    Ca    9.0      19 Nov 2021 05:48    TPro  7.1  /  Alb  3.6  /  TBili  2.2<H>  /  DBili  x   /  AST  21  /  ALT  29  /  AlkPhos  97  11-19            Clean Catch Clean Catch (Midstream)  10-06 @ 10:44   <10,000 CFU/mL Normal Urogenital Jesenia  --  --          Radiology Results      Meds    MEDICATIONS  (STANDING):  aspirin enteric coated 81 milliGRAM(s) Oral daily  dextrose 40% Gel 15 Gram(s) Oral once  dextrose 5%. 1000 milliLiter(s) (50 mL/Hr) IV Continuous <Continuous>  dextrose 5%. 1000 milliLiter(s) (100 mL/Hr) IV Continuous <Continuous>  dextrose 50% Injectable 25 Gram(s) IV Push once  dextrose 50% Injectable 12.5 Gram(s) IV Push once  dextrose 50% Injectable 25 Gram(s) IV Push once  glucagon  Injectable 1 milliGRAM(s) IntraMuscular once  heparin   Injectable 5000 Unit(s) SubCutaneous every 8 hours  insulin lispro (ADMELOG) corrective regimen sliding scale   SubCutaneous three times a day before meals  insulin lispro (ADMELOG) corrective regimen sliding scale   SubCutaneous at bedtime  mycophenolate mofetil 750 milliGRAM(s) Oral two times a day  tacrolimus 1 milliGRAM(s) Oral every 12 hours  tamsulosin 0.4 milliGRAM(s) Oral at bedtime  valsartan 80 milliGRAM(s) Oral daily      MEDICATIONS  (PRN):  acetaminophen     Tablet .. 650 milliGRAM(s) Oral every 6 hours PRN Temp greater or equal to 38C (100.4F), Mild Pain (1 - 3)      Physical Exam    Neuro :  no focal deficits  Respiratory: CTA B/L  CV: RRR, S1S2, no murmurs,   Abdominal: Soft, incisional tenderness, afua with sanguinous drainage, ND +BS, scope site dressing intact  Extremities: No edema, + peripheral pulses      ASSESSMENT    Cholecystitis  choledocolithiasis  h/o DM (diabetes mellitus)  Hypertension  H/O kidney transplant      PLAN    Laparoscopic cholecystectomy with intraoperative cholangiography 17-Nov-2021   surg f/u   pt NPO sincemidnight   Pain control PRN   DVT ppx  incentive spirometer  Resume home meds   Continue AFUA, monitor output   gi cons  pt for ERCP with Dr. Ramirez  renal f/u   cont current meds     Patient is a 81y old  Male who presents with a chief complaint of elective  cholecystectomy. (18 Nov 2021 13:30)    pt seen in icu [  ], reg surg floor [ x  ], bed [ x ], chair at bedside [   ], a+o x3 [x  ], lethargic [  ],    nad [ x ]      Allergies    No Known Allergies        Vitals    T(F): 97.9 (11-19-21 @ 05:50), Max: 98 (11-18-21 @ 13:55)  HR: 73 (11-19-21 @ 05:50) (72 - 76)  BP: 118/72 (11-19-21 @ 05:50) (109/65 - 128/69)  RR: 18 (11-19-21 @ 05:50) (17 - 18)  SpO2: 98% (11-19-21 @ 05:50) (95% - 98%)  Wt(kg): --  CAPILLARY BLOOD GLUCOSE      POCT Blood Glucose.: 99 mg/dL (18 Nov 2021 21:27)      Labs                          13.2   6.47  )-----------( 176      ( 19 Nov 2021 05:48 )             40.3       11-19    139  |  106  |  13  ----------------------------<  108<H>  4.5   |  26  |  0.96    Ca    9.0      19 Nov 2021 05:48    TPro  7.1  /  Alb  3.6  /  TBili  2.2<H>  /  DBili  x   /  AST  21  /  ALT  29  /  AlkPhos  97  11-19            Clean Catch Clean Catch (Midstream)  10-06 @ 10:44   <10,000 CFU/mL Normal Urogenital Jesenia  --  --          Radiology Results      Meds    MEDICATIONS  (STANDING):  aspirin enteric coated 81 milliGRAM(s) Oral daily  dextrose 40% Gel 15 Gram(s) Oral once  dextrose 5%. 1000 milliLiter(s) (50 mL/Hr) IV Continuous <Continuous>  dextrose 5%. 1000 milliLiter(s) (100 mL/Hr) IV Continuous <Continuous>  dextrose 50% Injectable 25 Gram(s) IV Push once  dextrose 50% Injectable 12.5 Gram(s) IV Push once  dextrose 50% Injectable 25 Gram(s) IV Push once  glucagon  Injectable 1 milliGRAM(s) IntraMuscular once  heparin   Injectable 5000 Unit(s) SubCutaneous every 8 hours  insulin lispro (ADMELOG) corrective regimen sliding scale   SubCutaneous three times a day before meals  insulin lispro (ADMELOG) corrective regimen sliding scale   SubCutaneous at bedtime  mycophenolate mofetil 750 milliGRAM(s) Oral two times a day  tacrolimus 1 milliGRAM(s) Oral every 12 hours  tamsulosin 0.4 milliGRAM(s) Oral at bedtime  valsartan 80 milliGRAM(s) Oral daily      MEDICATIONS  (PRN):  acetaminophen     Tablet .. 650 milliGRAM(s) Oral every 6 hours PRN Temp greater or equal to 38C (100.4F), Mild Pain (1 - 3)      Physical Exam    Neuro :  no focal deficits  Respiratory: CTA B/L  CV: RRR, S1S2, no murmurs,   Abdominal: Soft, incisional tenderness, afua with serosanguinous drainage, ND +BS, scope site dressing intact  Extremities: No edema, + peripheral pulses      ASSESSMENT    Cholecystitis  choledocolithiasis  h/o DM (diabetes mellitus)  Hypertension  H/O kidney transplant      PLAN    s/p Laparoscopic cholecystectomy with intraoperative cholangiography 17-Nov-2021   surg f/u   pt NPO since midnight   Pain control PRN   DVT ppx  incentive spirometer  Resume home meds   Continue AFUA, monitor output   gi f/u   pt for ERCP with Dr. Ramirez  renal f/u   continue current immunosuppresive regimen cellcept 750mg two times per day and tacrolimus 1mg two times per day  avoid NSAID's and IV contrast media  daily BMP  lytes/acid base and HGB at goal   s/p cholecystectomy  cont current meds

## 2021-11-19 NOTE — DISCHARGE NOTE PROVIDER - CARE PROVIDER_API CALL
Yeyo Tsang)  Surgery  [address] [facility], [place] [address]  Phone: [phone]  Fax: [phone]  Follow Up Time: 2 weeks    Darryn Davalos  Gastroenterology  [address] [place] [address]  Phone: [phone]  Fax: [phone]  Follow Up Time: 1 month

## 2021-11-19 NOTE — DISCHARGE NOTE PROVIDER - NSDCCPTREATMENT_GEN_ALL_CORE_FT
PRINCIPAL PROCEDURE  Procedure: Laparoscopic cholecystectomy with intraoperative cholangiography  Findings and Treatment: Please follow-up with your surgeon in 1 week. Drink plenty of fluids and rest as needed. Call for any fever over 101, nausea, vomiting, severe pain, no passing of gas or bowel movement.   DIET  You may resume your regular diet as normal.   SURGICAL SITES  Remove outer dressing and keep white steri-strips in place allowing them to fall off on their own. You may shower 48 hours post-operatively but do not bathe or soak in the water for 1-2 weeks; pat dry. If you notice any signs of surgical site infection (ie. redness, swelling, pain, pus drainage), please seek medical care immediately.   ACTIVITY  Do not lift anything heavier than 10 pounds for 2 weeks and avoid strenuous activity for 4-6 weeks.   PAIN CONTROL  You may take Motrin 600mg (with food) or Tylenol 650mg as needed for mild pain. Stagger one medication 3 hours after the other for maximum pain control. Maximum daily dose of Tylenol should not exceed 4grams/day.   You may take your prescribed pain medication for severe breakthrough pain not that is not relieved by Tylenol/Motrin. Do not drive or make important decisions while taking this medication and do not take more than 4 pills in 24 hours.

## 2021-11-19 NOTE — DISCHARGE NOTE PROVIDER - NSDCCPCAREPLAN_GEN_ALL_CORE_FT
PRINCIPAL DISCHARGE DIAGNOSIS  Diagnosis: Acute calculous cholecystitis  Assessment and Plan of Treatment:       SECONDARY DISCHARGE DIAGNOSES  Diagnosis: Choledocholithiasis  Assessment and Plan of Treatment:

## 2021-11-19 NOTE — PROGRESS NOTE ADULT - SUBJECTIVE AND OBJECTIVE BOX
INTERVAL HPI/OVERNIGHT EVENTS:    No acute events overnight.   Pt resting comfortably. Denies abdominal pain  denies n/v/f/c  Tolerated diet yesterday  Currently NPO    MEDICATIONS  (STANDING):  aspirin enteric coated 81 milliGRAM(s) Oral daily  dextrose 40% Gel 15 Gram(s) Oral once  dextrose 5%. 1000 milliLiter(s) (50 mL/Hr) IV Continuous <Continuous>  dextrose 5%. 1000 milliLiter(s) (100 mL/Hr) IV Continuous <Continuous>  dextrose 50% Injectable 25 Gram(s) IV Push once  dextrose 50% Injectable 12.5 Gram(s) IV Push once  dextrose 50% Injectable 25 Gram(s) IV Push once  glucagon  Injectable 1 milliGRAM(s) IntraMuscular once  heparin   Injectable 5000 Unit(s) SubCutaneous every 8 hours  insulin lispro (ADMELOG) corrective regimen sliding scale   SubCutaneous three times a day before meals  insulin lispro (ADMELOG) corrective regimen sliding scale   SubCutaneous at bedtime  mycophenolate mofetil 750 milliGRAM(s) Oral two times a day  tacrolimus 1 milliGRAM(s) Oral every 12 hours  tamsulosin 0.4 milliGRAM(s) Oral at bedtime  valsartan 80 milliGRAM(s) Oral daily    MEDICATIONS  (PRN):  acetaminophen     Tablet .. 650 milliGRAM(s) Oral every 6 hours PRN Temp greater or equal to 38C (100.4F), Mild Pain (1 - 3)      ICU Vital Signs Last 24 Hrs  T(C): 36.6 (19 Nov 2021 05:50), Max: 36.7 (18 Nov 2021 13:55)  T(F): 97.9 (19 Nov 2021 05:50), Max: 98 (18 Nov 2021 13:55)  HR: 73 (19 Nov 2021 05:50) (72 - 76)  BP: 118/72 (19 Nov 2021 05:50) (109/65 - 128/69)  BP(mean): --  ABP: --  ABP(mean): --  RR: 18 (19 Nov 2021 05:50) (17 - 18)  SpO2: 98% (19 Nov 2021 05:50) (95% - 98%)      PHYSICAL EXAM  General: Alert and oriented, not in acute distress  Resp: Breathing unlabored  Abdomen: Soft, nondistended, not tender, JULIUS with serosanguinous output  Extremities: No pedal edema    I&O's Detail  I&O's Detail    18 Nov 2021 07:01  -  19 Nov 2021 07:00  --------------------------------------------------------  IN:  Total IN: 0 mL    OUT:    Bulb (mL): 30 mL  Total OUT: 30 mL    Total NET: -30 mL                LABS:                          13.2   6.47  )-----------( 176      ( 19 Nov 2021 05:48 )             40.3     11-19    139  |  106  |  13  ----------------------------<  108<H>  4.5   |  26  |  0.96    Ca    9.0      19 Nov 2021 05:48    TPro  7.1  /  Alb  3.6  /  TBili  2.2<H>  /  DBili  x   /  AST  21  /  ALT  29  /  AlkPhos  97  11-19

## 2021-11-20 ENCOUNTER — TRANSCRIPTION ENCOUNTER (OUTPATIENT)
Age: 81
End: 2021-11-20

## 2021-11-20 VITALS
HEART RATE: 84 BPM | TEMPERATURE: 97 F | OXYGEN SATURATION: 98 % | DIASTOLIC BLOOD PRESSURE: 58 MMHG | RESPIRATION RATE: 17 BRPM | SYSTOLIC BLOOD PRESSURE: 91 MMHG

## 2021-11-20 LAB
GLUCOSE BLDC GLUCOMTR-MCNC: 104 MG/DL — HIGH (ref 70–99)
GLUCOSE BLDC GLUCOMTR-MCNC: 104 MG/DL — HIGH (ref 70–99)

## 2021-11-20 PROCEDURE — 80197 ASSAY OF TACROLIMUS: CPT

## 2021-11-20 PROCEDURE — 85730 THROMBOPLASTIN TIME PARTIAL: CPT

## 2021-11-20 PROCEDURE — 80076 HEPATIC FUNCTION PANEL: CPT

## 2021-11-20 PROCEDURE — 85025 COMPLETE CBC W/AUTO DIFF WBC: CPT

## 2021-11-20 PROCEDURE — 80053 COMPREHEN METABOLIC PANEL: CPT

## 2021-11-20 PROCEDURE — C1889: CPT

## 2021-11-20 PROCEDURE — C1769: CPT

## 2021-11-20 PROCEDURE — 36415 COLL VENOUS BLD VENIPUNCTURE: CPT

## 2021-11-20 PROCEDURE — 76000 FLUOROSCOPY <1 HR PHYS/QHP: CPT

## 2021-11-20 PROCEDURE — 82962 GLUCOSE BLOOD TEST: CPT

## 2021-11-20 PROCEDURE — 86850 RBC ANTIBODY SCREEN: CPT

## 2021-11-20 PROCEDURE — 86900 BLOOD TYPING SEROLOGIC ABO: CPT

## 2021-11-20 PROCEDURE — 88304 TISSUE EXAM BY PATHOLOGIST: CPT

## 2021-11-20 PROCEDURE — 86901 BLOOD TYPING SEROLOGIC RH(D): CPT

## 2021-11-20 PROCEDURE — 80048 BASIC METABOLIC PNL TOTAL CA: CPT

## 2021-11-20 PROCEDURE — 85027 COMPLETE CBC AUTOMATED: CPT

## 2021-11-20 PROCEDURE — 85610 PROTHROMBIN TIME: CPT

## 2021-11-20 PROCEDURE — 86769 SARS-COV-2 COVID-19 ANTIBODY: CPT

## 2021-11-20 RX ORDER — OXYCODONE AND ACETAMINOPHEN 5; 325 MG/1; MG/1
1 TABLET ORAL
Qty: 16 | Refills: 0
Start: 2021-11-20 | End: 2021-11-23

## 2021-11-20 RX ADMIN — MYCOPHENOLATE MOFETIL 750 MILLIGRAM(S): 250 CAPSULE ORAL at 06:08

## 2021-11-20 RX ADMIN — TACROLIMUS 1 MILLIGRAM(S): 5 CAPSULE ORAL at 06:08

## 2021-11-20 RX ADMIN — HEPARIN SODIUM 5000 UNIT(S): 5000 INJECTION INTRAVENOUS; SUBCUTANEOUS at 06:08

## 2021-11-20 RX ADMIN — Medication 81 MILLIGRAM(S): at 11:51

## 2021-11-20 RX ADMIN — HEPARIN SODIUM 5000 UNIT(S): 5000 INJECTION INTRAVENOUS; SUBCUTANEOUS at 14:23

## 2021-11-20 RX ADMIN — VALSARTAN 80 MILLIGRAM(S): 80 TABLET ORAL at 09:22

## 2021-11-20 NOTE — DISCHARGE NOTE NURSING/CASE MANAGEMENT/SOCIAL WORK - PATIENT PORTAL LINK FT
You can access the FollowMyHealth Patient Portal offered by [facility] by registering at the following website: [email or web address]. By joining [employer]’s FollowMyHealth portal, you will also be able to view your health information using other applications (apps) compatible with our system.

## 2021-11-20 NOTE — PROGRESS NOTE ADULT - SUBJECTIVE AND OBJECTIVE BOX
INTERVAL HPI/OVERNIGHT EVENTS:    No acute events overnight.   Had ERCP yesterday  Pt resting comfortably. Denies abdominal pain  denies n/v/f/c  Tolerating reg diet    MEDICATIONS  (STANDING):  aspirin enteric coated 81 milliGRAM(s) Oral daily  dextrose 40% Gel 15 Gram(s) Oral once  dextrose 5%. 1000 milliLiter(s) (50 mL/Hr) IV Continuous <Continuous>  dextrose 5%. 1000 milliLiter(s) (100 mL/Hr) IV Continuous <Continuous>  dextrose 50% Injectable 25 Gram(s) IV Push once  dextrose 50% Injectable 12.5 Gram(s) IV Push once  dextrose 50% Injectable 25 Gram(s) IV Push once  glucagon  Injectable 1 milliGRAM(s) IntraMuscular once  heparin   Injectable 5000 Unit(s) SubCutaneous every 8 hours  insulin lispro (ADMELOG) corrective regimen sliding scale   SubCutaneous three times a day before meals  insulin lispro (ADMELOG) corrective regimen sliding scale   SubCutaneous at bedtime  mycophenolate mofetil 750 milliGRAM(s) Oral two times a day  tacrolimus 1 milliGRAM(s) Oral every 12 hours  tamsulosin 0.4 milliGRAM(s) Oral at bedtime  valsartan 80 milliGRAM(s) Oral daily    MEDICATIONS  (PRN):  acetaminophen     Tablet .. 650 milliGRAM(s) Oral every 6 hours PRN Temp greater or equal to 38C (100.4F), Mild Pain (1 - 3)        ICU Vital Signs Last 24 Hrs  T(C): 36.5 (20 Nov 2021 09:05), Max: 36.8 (20 Nov 2021 06:26)  T(F): 97.7 (20 Nov 2021 09:05), Max: 98.3 (20 Nov 2021 06:26)  HR: 71 (20 Nov 2021 09:05) (67 - 71)  BP: 111/68 (20 Nov 2021 09:05) (102/62 - 120/69)  BP(mean): --  ABP: --  ABP(mean): --  RR: 16 (20 Nov 2021 09:05) (16 - 18)  SpO2: 96% (20 Nov 2021 09:05) (96% - 98%)        PHYSICAL EXAM  General: Alert and oriented, not in acute distress  Resp: Breathing unlabored  Abdomen: Soft, nondistended, not tender, JULIUS with serosanguinous output  Extremities: No pedal edema    I&O's Detail    19 Nov 2021 07:01  -  20 Nov 2021 07:00  --------------------------------------------------------  IN:  Total IN: 0 mL    OUT:    Bulb (mL): 35 mL  Total OUT: 35 mL    Total NET: -35 mL

## 2021-11-20 NOTE — PROGRESS NOTE ADULT - ASSESSMENT
81 yoM s/p lap joel pod#1 with positive IOC    - NPO for procedure  - ERCP today  - continue immunosuppressant  - medicine consulted for periop mng  - dvt/gi ppx  
81 yoM s/p lap joel pod#2 with positive IOC    - NPO for procedure  - ERCP today  - continue immunosuppressant  - medicine f/u appreciated  - dvt/gi ppx  
81 yoM s/p lap joel pod#2 with positive IOC, s/p ERCP 11/19, doing well    - D/c JULIUS drain today  - D/c home with pain medication  - F/u with Dr. Tsang in 1 week
OR findings discussed with patient.  Plan: ERCP in AM>
Patient is a 81y Male whom presented to the hospital for elective cholecystectomy.   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  patient known to the renal service from a recent hospitalization a month prior.  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  Serum Creatinine at goal.   on cellcept 750mg two times per day and tacrolimus 1mg two times a day      A/P:  s/p DDRTXP. continue current immunosuppresive regimen cellcept 750mg two times per day and tacrolimus 1mg two times per day  avoid NSAID's and IV contrast media  daily BMP  lytes/acid base and HGB at goal   s/p cholecystectomy- and ERCP   
80 y/o male s/p lap joel 11/17 with +IOC, hx of kidney transplant 2016. POD#0, postop stable     -ERCP tomorrow with Dr. Ramirez   -CLD, NPO at midnight   -F/u medicine Dr. Rivas   -F/u nephrology   -Pain control PRN   -DVT ppx  -Resume home meds   -AM labs   -Continue JULIUS, monitor output 
Patient is a 81y Male whom presented to the hospital for elective cholecystectomy.   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  patient known to the renal service from a recent hospitalization a month prior.  A CT scan then revealed mild transplant hydronephrosis ( [place] [facility] [place]   admitted for elective cholecystectomy  s/p  ERCP .  Serum Creatinine at goal.   on cellcept 750mg two times per day and tacrolimus 1mg two times a day      A/P:  s/p DDRTXP. continue current immunosuppresive regimen cellcept 750mg two times per day and tacrolimus 1mg two times per day  avoid NSAID's and IV contrast media  daily BMP  lytes/acid base and HGB at goal   s/p cholecystectomy- and ERCP       For any question, call:  Cell # [phone]  Pager # [phone]  Callback # [phone]

## 2021-11-20 NOTE — PROGRESS NOTE ADULT - SUBJECTIVE AND OBJECTIVE BOX
[facility] - GENEVA Robison / GENEVA Hamilton / MICHAEL Sinclair/ GENEVA Barclay/ GENEVA Trujillo/ ASIA Carson / SHELBY Mccartney / PETAR Ornelas  ---------------------------------------------------------------------------------------------------------------  seen and examined today for DDKTx  Interval : NAD  VITALS:  T(F): 97.7 (11-20-21 @ 09:05), Max: 98.3 (11-20-21 @ 06:26)  HR: 71 (11-20-21 @ 09:05)  BP: 111/68 (11-20-21 @ 09:05)  RR: 16 (11-20-21 @ 09:05)  SpO2: 96% (11-20-21 @ 09:05)  Wt(kg): --    11-19 @ 07:01  -  11-20 @ 07:00  --------------------------------------------------------  IN: 0 mL / OUT: 35 mL / NET: -35 mL      Physical Exam :-  Constitutional: NAD  Neck: Supple.  Respiratory: Bilateral equal breath sounds,  Cardiovascular: S1, S2 normal,  Gastrointestinal: Bowel Sounds present, soft, non tender.  Extremities: No edema, LUE AVF working  Neurological: Alert and Oriented x 3, no focal deficits  Psychiatric: Normal mood, normal affect  Data:-  Allergies :   No Known Allergies    Hospital Medications:   MEDICATIONS  (STANDING):  aspirin enteric coated 81 milliGRAM(s) Oral daily  dextrose 40% Gel 15 Gram(s) Oral once  dextrose 5%. 1000 milliLiter(s) (50 mL/Hr) IV Continuous <Continuous>  dextrose 5%. 1000 milliLiter(s) (100 mL/Hr) IV Continuous <Continuous>  dextrose 50% Injectable 25 Gram(s) IV Push once  dextrose 50% Injectable 12.5 Gram(s) IV Push once  dextrose 50% Injectable 25 Gram(s) IV Push once  glucagon  Injectable 1 milliGRAM(s) IntraMuscular once  heparin   Injectable 5000 Unit(s) SubCutaneous every 8 hours  insulin lispro (ADMELOG) corrective regimen sliding scale   SubCutaneous three times a day before meals  insulin lispro (ADMELOG) corrective regimen sliding scale   SubCutaneous at bedtime  mycophenolate mofetil 750 milliGRAM(s) Oral two times a day  tacrolimus 1 milliGRAM(s) Oral every 12 hours  tamsulosin 0.4 milliGRAM(s) Oral at bedtime  valsartan 80 milliGRAM(s) Oral daily    11-19    139  |  106  |  13  ----------------------------<  108<H>  4.5   |  26  |  0.96    Ca    9.0      19 Nov 2021 05:48    TPro  7.1  /  Alb  3.6  /  TBili  2.2<H>  /  DBili      /  AST  21  /  ALT  29  /  AlkPhos  97  11-19    Creatinine Trend: 0.96 <--, 0.87 <--, 1.07 <--                        13.2   6.47  )-----------( 176      ( 19 Nov 2021 05:48 )             40.3

## 2021-11-20 NOTE — PROGRESS NOTE ADULT - SUBJECTIVE AND OBJECTIVE BOX
Patient is a 81y old  Male who presents with a chief complaint of elective  cholecystectomy. (18 Nov 2021 13:30)    pt seen in icu [  ], reg surg floor [ x  ], bed [ x ], chair at bedside [   ], a+o x3 [x  ], lethargic [  ],    nad [ x ]        Allergies    No Known Allergies        Vitals    T(F): 98.3 (11-20-21 @ 06:26), Max: 98.3 (11-20-21 @ 06:26)  HR: 67 (11-20-21 @ 06:26) (67 - 71)  BP: 113/63 (11-20-21 @ 06:26) (102/62 - 120/69)  RR: 17 (11-20-21 @ 06:26) (17 - 18)  SpO2: 96% (11-20-21 @ 06:26) (96% - 98%)  Wt(kg): --  CAPILLARY BLOOD GLUCOSE      POCT Blood Glucose.: 115 mg/dL (19 Nov 2021 20:58)      Labs                          13.2   6.47  )-----------( 176      ( 19 Nov 2021 05:48 )             40.3       11-19    139  |  106  |  13  ----------------------------<  108<H>  4.5   |  26  |  0.96    Ca    9.0      19 Nov 2021 05:48    TPro  7.1  /  Alb  3.6  /  TBili  2.2<H>  /  DBili  x   /  AST  21  /  ALT  29  /  AlkPhos  97  11-19            Clean Catch Clean Catch (Midstream)  10-06 @ 10:44   <10,000 CFU/mL Normal Urogenital Jesenia  --  --          Radiology Results      Meds    MEDICATIONS  (STANDING):  aspirin enteric coated 81 milliGRAM(s) Oral daily  dextrose 40% Gel 15 Gram(s) Oral once  dextrose 5%. 1000 milliLiter(s) (50 mL/Hr) IV Continuous <Continuous>  dextrose 5%. 1000 milliLiter(s) (100 mL/Hr) IV Continuous <Continuous>  dextrose 50% Injectable 25 Gram(s) IV Push once  dextrose 50% Injectable 12.5 Gram(s) IV Push once  dextrose 50% Injectable 25 Gram(s) IV Push once  glucagon  Injectable 1 milliGRAM(s) IntraMuscular once  heparin   Injectable 5000 Unit(s) SubCutaneous every 8 hours  insulin lispro (ADMELOG) corrective regimen sliding scale   SubCutaneous three times a day before meals  insulin lispro (ADMELOG) corrective regimen sliding scale   SubCutaneous at bedtime  mycophenolate mofetil 750 milliGRAM(s) Oral two times a day  tacrolimus 1 milliGRAM(s) Oral every 12 hours  tamsulosin 0.4 milliGRAM(s) Oral at bedtime  valsartan 80 milliGRAM(s) Oral daily      MEDICATIONS  (PRN):  acetaminophen     Tablet .. 650 milliGRAM(s) Oral every 6 hours PRN Temp greater or equal to 38C (100.4F), Mild Pain (1 - 3)      Physical Exam    Neuro :  no focal deficits  Respiratory: CTA B/L  CV: RRR, S1S2, no murmurs,   Abdominal: Soft, incisional tenderness, afua with serosanguinous drainage, ND +BS, scope site dressing intact  Extremities: No edema, + peripheral pulses      ASSESSMENT    Cholecystitis  choledocolithiasis  h/o DM (diabetes mellitus)  Hypertension  H/O kidney transplant      PLAN    s/p Laparoscopic cholecystectomy with intraoperative cholangiography 17-Nov-2021   surg f/u   pt NPO since midnight   Pain control PRN   DVT ppx  incentive spirometer  Resume home meds   Continue AFUA, monitor output   gi f/u   pt for ERCP with Dr. Ramirez  renal f/u   continue current immunosuppresive regimen cellcept 750mg two times per day and tacrolimus 1mg two times per day  avoid NSAID's and IV contrast media  daily BMP  lytes/acid base and HGB at goal   s/p cholecystectomy  cont current meds       Patient is a 81y old  Male who presents with a chief complaint of elective  cholecystectomy. (18 Nov 2021 13:30)    pt seen in icu [  ], reg surg floor [ x  ], bed [ x ], chair at bedside [   ], a+o x3 [x  ], lethargic [  ],    nad [ x ]        Allergies    No Known Allergies        Vitals    T(F): 98.3 (11-20-21 @ 06:26), Max: 98.3 (11-20-21 @ 06:26)  HR: 67 (11-20-21 @ 06:26) (67 - 71)  BP: 113/63 (11-20-21 @ 06:26) (102/62 - 120/69)  RR: 17 (11-20-21 @ 06:26) (17 - 18)  SpO2: 96% (11-20-21 @ 06:26) (96% - 98%)  Wt(kg): --  CAPILLARY BLOOD GLUCOSE      POCT Blood Glucose.: 115 mg/dL (19 Nov 2021 20:58)      Labs                          13.2   6.47  )-----------( 176      ( 19 Nov 2021 05:48 )             40.3       11-19    139  |  106  |  13  ----------------------------<  108<H>  4.5   |  26  |  0.96    Ca    9.0      19 Nov 2021 05:48    TPro  7.1  /  Alb  3.6  /  TBili  2.2<H>  /  DBili  x   /  AST  21  /  ALT  29  /  AlkPhos  97  11-19        < from: ERCP (11.19.21 @ 12:00) >  Impressions:      No retained stones appreciated History of large sphincterotomy.     Summary:     Biliary sphincterotomy (Present)     No stones appreciated    Plan: Return to floor for further management    Discharge to home from GI perspective    < end of copied text >        Radiology Results      Meds    MEDICATIONS  (STANDING):  aspirin enteric coated 81 milliGRAM(s) Oral daily  dextrose 40% Gel 15 Gram(s) Oral once  dextrose 5%. 1000 milliLiter(s) (50 mL/Hr) IV Continuous <Continuous>  dextrose 5%. 1000 milliLiter(s) (100 mL/Hr) IV Continuous <Continuous>  dextrose 50% Injectable 25 Gram(s) IV Push once  dextrose 50% Injectable 12.5 Gram(s) IV Push once  dextrose 50% Injectable 25 Gram(s) IV Push once  glucagon  Injectable 1 milliGRAM(s) IntraMuscular once  heparin   Injectable 5000 Unit(s) SubCutaneous every 8 hours  insulin lispro (ADMELOG) corrective regimen sliding scale   SubCutaneous three times a day before meals  insulin lispro (ADMELOG) corrective regimen sliding scale   SubCutaneous at bedtime  mycophenolate mofetil 750 milliGRAM(s) Oral two times a day  tacrolimus 1 milliGRAM(s) Oral every 12 hours  tamsulosin 0.4 milliGRAM(s) Oral at bedtime  valsartan 80 milliGRAM(s) Oral daily      MEDICATIONS  (PRN):  acetaminophen     Tablet .. 650 milliGRAM(s) Oral every 6 hours PRN Temp greater or equal to 38C (100.4F), Mild Pain (1 - 3)      Physical Exam    Neuro :  no focal deficits  Respiratory: CTA B/L  CV: RRR, S1S2, no murmurs,   Abdominal: Soft, incisional tenderness, afua with serosanguinous drainage, ND +BS, scope site dressing intact  Extremities: No edema, + peripheral pulses      ASSESSMENT    Cholecystitis  choledocolithiasis  h/o DM (diabetes mellitus)  Hypertension  H/O kidney transplant      PLAN    s/p Laparoscopic cholecystectomy with intraoperative cholangiography 17-Nov-2021   surg f/u   Pain control PRN   DVT ppx  incentive spirometer  Continue AFUA, monitor output   gi f/u   s/p ERCP with No retained stones appreciated History of large sphincterotomy.   Discharge to home from GI perspective  renal f/u   continue current immunosuppresive regimen cellcept 750mg two times per day and tacrolimus 1mg two times per day  avoid NSAID's and IV contrast media  daily BMP  lytes/acid base and HGB at goal   cont current meds

## 2021-11-20 NOTE — DISCHARGE NOTE NURSING/CASE MANAGEMENT/SOCIAL WORK - NSDCVIVACCINE_GEN_ALL_CORE_FT
rabies, intradermal injection; 04-Sep-2020 17:59; Dona Boyd (RN); [employer]; [id number] (Exp. Date: 05-Jan-2023); IntraMuscular; Deltoid Right.; 1 milliLiter(s); VIS (VIS Published: 04-Oct-2021, VIS Presented: 04-Sep-2020);   rabies, intradermal injection; 07-Sep-2020 10:42; Jeanine Tapia (RN); [employer]; [id number] (Exp. Date: 05-May-2023); IntraMuscular; Deltoid Right.; 1 milliLiter(s); VIS (VIS Published: 08-Aug-2018, VIS Presented: 07-Sep-2020);   rabies, intradermal injection; 11-Sep-2020 10:39; Rachel Teague (RN); [employer]; [id number] (Exp. Date: 01-May-2021); IntraMuscular; Deltoid Right.; 1 milliLiter(s); VIS (VIS Published: 11-Sep-2020, VIS Presented: 11-Sep-2020);   rabies, intradermal injection; 18-Sep-2020 10:47; Cara Aguilar (RN); [employer]; [id number] (Exp. Date: 01-Jan-2024); IntraMuscular; Deltoid Right.; 1 milliLiter(s); VIS (VIS Published: 16-Jul-2020, VIS Presented: 18-Sep-2020);   Tdap; 20-Oct-2019 14:22; Lori Carlos (RN); [employer]; [id number] (Exp. Date: 22-Oct-2022); IntraMuscular; Deltoid Right.; 0.5 milliLiter(s); VIS (VIS Published: 09-May-2013, VIS Presented: 20-Oct-2019);

## 2021-11-29 LAB — SURGICAL PATHOLOGY STUDY: SIGNIFICANT CHANGE UP

## 2021-11-30 PROBLEM — K81.9 CHOLECYSTITIS: Status: ACTIVE | Noted: 2021-10-21

## 2021-12-02 ENCOUNTER — APPOINTMENT (OUTPATIENT)
Dept: SURGERY | Facility: CLINIC | Age: 81
End: 2021-12-02
Payer: MEDICAID

## 2021-12-02 VITALS — TEMPERATURE: 96.8 F

## 2021-12-02 DIAGNOSIS — K81.9 CHOLECYSTITIS, UNSPECIFIED: ICD-10-CM

## 2021-12-02 PROCEDURE — 99024 POSTOP FOLLOW-UP VISIT: CPT

## 2021-12-02 NOTE — DATA REVIEWED
[FreeTextEntry1] : Richie Accession Number : [id number]\par Patient:   CARLOS GODINEZ\par \par \par Accession:                             [id number]\par \par Collected Date/Time:                   11/17/2021 12:10 EST\par Received Date/Time:                    11/18/2021 08:00 EST\par \par Surgical Pathology Report - Auth (Verified)\par \par Specimen(s) Submitted\par 1  Gallbladder and Gallstone\par \par Final Diagnosis\par Gallbladder ; laparoscopic cholecystectomy:\par - Chronic cholecystitis with lymphoid follicles\par - Cholelithiasis\par \par Verified by: Rayna Harris M.D.\par (Electronic Signature)\par Reported on: 11/29/21 11:30 EST, [address]\par Phone: [phone]   Fax: [phone]\par _________________________________________________________________\par \par Clinical Information\par Cholecystitis cholelithiasis\par \par

## 2021-12-02 NOTE — PLAN
[FreeTextEntry1] : Mr. GODINEZ will follow up  if needed. Warning signs, follow up, and restrictions were discussed with the patient.

## 2021-12-02 NOTE — ASSESSMENT
[FreeTextEntry1] : Mr. GODINEZ is doing well, with excellent post-operative recovery. All surgical incisions are healing well and as expected. There is no evidence of infection or complication, and he is progressing as expected. Post-operative wound care, activity, restrictions and precautions reinforced. Patient instructed to refrain from any heavy lifting greater than 10-15 pounds for at least 4 weeks post-operatively. pathology results were discussed in details.  Patient's questions and concerns addressed to patient's satisfaction.

## 2021-12-02 NOTE — HISTORY OF PRESENT ILLNESS
[de-identified] : Mr. GODINEZ  is s/p laparoscopic  cholecystectomy  on 11/17/2021. IOC was  positive for CBD stone. Today Mr. GODINEZ offers no complaints. patient reports no fever, chills,  or  pain. His  surgical wounds are  healing well. No signs of inflammation, infection or exudate. Patient reports good bowel movements and appetite.

## 2021-12-02 NOTE — PHYSICAL EXAM
[Alert] : alert [Oriented to Person] : oriented to person [Oriented to Place] : oriented to place [Oriented to Time] : oriented to time [Calm] : calm [de-identified] : He  is alert, well-groomed, and in NAD\par   [de-identified] : Surgical wounds are  healing well.   no signs of  inflammation or infection.

## 2023-06-28 NOTE — ASU PATIENT PROFILE, ADULT - INTERNATIONAL TRAVEL
LVM for patient regarding upcoming VV with Dr. Villalobos.  This author's direct line provided for future questions/concerns.    Brayden Chavarria, RN  RN Care Coordinator - Urology    
No

## 2023-07-13 NOTE — CONSULT LETTER
[Dear  ___] : Dear  [unfilled], [Consult Letter:] : I had the pleasure of evaluating your patient, [unfilled]. [Please see my note below.] : Please see my note below. [Consult Closing:] : Thank you very much for allowing me to participate in the care of this patient.  If you have any questions, please do not hesitate to contact me. [Sincerely,] : Sincerely, [FreeTextEntry3] : Yeyo Tsang MD, FACS  No

## 2023-09-15 NOTE — ED ADULT NURSE NOTE - NS ED NURSE LEVEL OF CONSCIOUSNESS ORIENTATION
Follow up in 3 weeks HTN/Med check  Stop Hydrochlorothiazide  Start Nifedipine 30mg once daily   Please keep a log of blood pressures 3x a day so we can get an accurate measure of how your blood pressure is responding to the medication  Please let me know if you are having any side effects from the medications  Potassium supplement for 1 week 10mEq 1 tablet daily   Thank you for your visit today! Oriented - self; Oriented - place; Oriented - time

## 2024-09-18 NOTE — H&P PST ADULT - CENTRAL VENOUS CATHETER
no
Bed in lowest position, wheels locked, appropriate side rails in place/Call bell, personal items and telephone in reach/Instruct patient to call for assistance before getting out of bed or chair/Non-slip footwear when patient is out of bed/[place] to call system/Physically safe environment - no spills, clutter or unnecessary equipment/Purposeful Proactive Rounding/Room/bathroom lighting operational, light cord in reach

## 2024-11-15 NOTE — H&P PST ADULT - RS GEN PE MLT RESP DETAILS PC
Called and left message for patient regarding missed cataract consult with Dr Jackson today. Left message for patient to call back.  Some potential dates are 1-14-24  at 2:00 ([place]),  1-16-24 at 2:00 pm ( [place]) or 1-21-24 at  2:00 pm ([place])   normal/airway patent/breath sounds equal/no rales/no rhonchi/no wheezes

## 2025-05-20 NOTE — ED PROVIDER NOTE - CROS ED HEME ALL NEG
Detail Level: Detailed Quality 226: Preventive Care And Screening: Tobacco Use: Screening And Cessation Intervention: Patient screened for tobacco use and is an ex/non-smoker Quality 130: Documentation Of Current Medications In The Medical Record: Current Medications Documented negative...